# Patient Record
Sex: MALE | Race: WHITE | Employment: FULL TIME | URBAN - NONMETROPOLITAN AREA
[De-identification: names, ages, dates, MRNs, and addresses within clinical notes are randomized per-mention and may not be internally consistent; named-entity substitution may affect disease eponyms.]

---

## 2021-07-21 LAB
AVERAGE GLUCOSE: NORMAL
HBA1C MFR BLD: 9.4 %

## 2021-08-02 ENCOUNTER — OFFICE VISIT (OUTPATIENT)
Dept: INTERNAL MEDICINE CLINIC | Age: 41
End: 2021-08-02

## 2021-08-02 VITALS
HEART RATE: 88 BPM | BODY MASS INDEX: 36.27 KG/M2 | DIASTOLIC BLOOD PRESSURE: 82 MMHG | SYSTOLIC BLOOD PRESSURE: 120 MMHG | WEIGHT: 267.8 LBS | TEMPERATURE: 97.1 F | HEIGHT: 72 IN

## 2021-08-02 DIAGNOSIS — Z79.4 TYPE 2 DIABETES MELLITUS WITHOUT COMPLICATION, WITH LONG-TERM CURRENT USE OF INSULIN (HCC): ICD-10-CM

## 2021-08-02 DIAGNOSIS — E11.9 TYPE 2 DIABETES MELLITUS WITHOUT COMPLICATION, WITH LONG-TERM CURRENT USE OF INSULIN (HCC): ICD-10-CM

## 2021-08-02 PROCEDURE — G0108 DIAB MANAGE TRN  PER INDIV: HCPCS | Performed by: INTERNAL MEDICINE

## 2021-08-02 RX ORDER — RAMIPRIL 1.25 MG/1
1.25 CAPSULE ORAL DAILY
COMMUNITY
End: 2022-07-18 | Stop reason: SDUPTHER

## 2021-08-02 RX ORDER — GLIMEPIRIDE 4 MG/1
4 TABLET ORAL 2 TIMES DAILY WITH MEALS
COMMUNITY
End: 2021-09-17

## 2021-08-02 RX ORDER — ATORVASTATIN CALCIUM 20 MG/1
20 TABLET, FILM COATED ORAL NIGHTLY
COMMUNITY
End: 2022-07-18 | Stop reason: SDUPTHER

## 2021-08-02 RX ORDER — INSULIN GLARGINE 100 [IU]/ML
INJECTION, SOLUTION SUBCUTANEOUS NIGHTLY
COMMUNITY
End: 2022-07-18 | Stop reason: SDUPTHER

## 2021-08-02 NOTE — PATIENT INSTRUCTIONS
Mealtime insulin options. Humalog, Novolog, Fiasp, Admelog     Consider asking about once weekly injections: Trulicity, Bydureon,                                                                                      Ozempic  Insulin pump options:         OmniPod (no tubing), Medtronic and Tandem  Get an appointment scheduled to see your eye doctor  Try to check blood sugars at least 2 times per day           --waking up and either before evening meal or bedtime         Goal . If you check 2 hours after eating-goal is under 150  Get your lab work done!!  Continue with meal plan--try to keep carbohydrates at 60 grams at             Each meal  With your new job, set a plan to get 5,000 steps during the day/ evening  Keep a log of what you are eating for at least 1 week and bring to  Your              Next appointments.  Bring your meter to all of your appointments

## 2021-08-02 NOTE — PROGRESS NOTES
Diabetes Mellitus Type II, Initial Visit:   Arianne Arnold CNP  Patient here for an initial evaluation of Type 2 diabetes mellitus. Current symptoms/problems include none. The patient was initially diagnosed with Type 2 diabetes mellitus 2013. Known diabetic complications: peripheral neuropathy  Cardiovascular risk factors: diabetes mellitus, family history of premature cardiovascular disease, male gender, obesity (BMI >= 30 kg/m2) and smoking/ tobacco exposure  Current diabetic medications include Basaglar, Humalog (not taking), Glimepiride, Metformin, Januvia. Eye exam current (within one year): none. Needs to make appt  Weight trend: fluctuating a bit  Prior visit with dietician: no  Current diet: B 6am                       8am bkfst sausage burritos (2); dietcoke                     L 11a-2pm eat out. 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 no  Is He on ACE inhibitor or angiotensin II receptor blocker? Yes   ramipril (Altace)  Feet: Last assessed 7/21/21 per LEONIDES Kelley CNP    Diabetes education. Recent A1C was 9.4%. [facility], but had not followed up. He reports that his blood sugars have not been in controlled during those years. He was recently started on Humalog, but it is not covered by insurance so he is not taking it. He was also recently started on low dose Januvia.  Rybelsus was tried, but Casa Musa states his blood sugars went high (based on freq urination/ not BG results). He is interested in the [facility] and would be a good candidate. We will request a script. We reviewed therapies; discussing option of getting rid of Glimepiride. Also weekly GLP1 injection vs Januvia. He will contact insurance company regarding meal time insulin. We also briefly discussed insulin pump therapy. He will want to start counting carbohydrates. Encouragement given; reviewed focus of preventing complications. Follow up 2 weeks with RD. Plan:  Reviewed SGM goals; medication options; carbs; exercise; CGM; insulin pump options. Mealtime insulin options. Humalog, Novolog, Fiasp, Admelog     Consider asking about once weekly injections: Trulicity, Bydureon,                                                                                      Ozempic  Insulin pump options:         OmniPod (no tubing), Medtronic and Tandem  Get an appointment scheduled to see your eye doctor  Try to check blood sugars at least 2 times per day           --waking up and either before evening meal or bedtime         Goal . If you check 2 hours after eating-goal is under 150  Get your lab work done!!  Continue with meal plan--try to keep carbohydrates at 60 grams at             Each meal  With your new job, set a plan to get 5,000 steps during the day/ evening  Keep a log of what you are eating for at least 1 week and bring to  Your              Next appointments. Bring your meter to all of your appointments  Casa  voiced understanding via teach back and willingness to participate in the above plan of care. Time spent in direct contact with patient 60 minutes. Denae Briceño CNP,        Please send a script to [phone] for his Dexcom and supplies. 1- Dexcom reader - 1 each. No refills  2- Dexcom transmitter - 1 each every 3 months. Refills x3.  3- Dexcom sensors - 1 each every 10days. Qty 9.  Refills                  x3.

## 2021-08-12 ENCOUNTER — OFFICE VISIT (OUTPATIENT)
Dept: INTERNAL MEDICINE CLINIC | Age: 41
End: 2021-08-12
Payer: COMMERCIAL

## 2021-08-12 VITALS — BODY MASS INDEX: 35.76 KG/M2 | HEIGHT: 72 IN | WEIGHT: 264 LBS | TEMPERATURE: 98.7 F

## 2021-08-12 DIAGNOSIS — Z79.4 TYPE 2 DIABETES MELLITUS WITHOUT COMPLICATION, WITH LONG-TERM CURRENT USE OF INSULIN (HCC): ICD-10-CM

## 2021-08-12 DIAGNOSIS — E11.9 TYPE 2 DIABETES MELLITUS WITHOUT COMPLICATION, WITH LONG-TERM CURRENT USE OF INSULIN (HCC): ICD-10-CM

## 2021-08-12 PROCEDURE — 97802 MEDICAL NUTRITION INDIV IN: CPT | Performed by: DIETITIAN, REGISTERED

## 2021-08-12 NOTE — PATIENT INSTRUCTIONS
1. )  Get familiar with reading the nutrition facts label by looking at serving size and total carbohydrates. - Without a label refer to your carb count guide booklet. 2.)  Measure foods for accuracy in carb counting.    3.)  Healthy carb choices:  whole grains, whole wheat pasta, starchy vegetables, fresh fruit and lowfat/non-fat milk and yogurt. 4.)  Your meal plan is found on the inside cover of your carb counting guide booklet:  1st meal or Brkf:  60 gms carbohydrates + 1-2 oz protein  2nd meal or Lunch: 60 gms carbohydrates + 2-3 oz protein + non-starchy veggies  3rd meal or Dinner:  60 gms carbohydrates + 2-3 oz protein + non- starchy veggies    Snack time:  15 - 20 gms carbohydrates + 1 oz protein    5.)  Choose lean protein most of the time and Cut in 1/2 added fats to help with weight loss efforts. 6.) Bring a 1-2 week food log and meter to next dietitian appt. Thanks. Check BS:  2x/day  Fasting BS (1st thing in the morning) or before a meal (at least 4 hour since last ate), BS goal:  90 - 130  2 hours after the start of a meal, BS goal:  100 - 150    7.)  Work in extra physical activity each day.

## 2021-08-12 NOTE — PROGRESS NOTES
[address] [place] [address]. [address] [place] [address] Tj., MartinDeys[facility], [address]  [phone] (phone)  [phone] (fax)    Patient Name: Edu Buckley. Date of Birth: [birth year]. MRN: [id number]      Assessment: Patient is a 38 y.o. male seen for Initial MNT visit for Type 2 DB.     -Nutritionally relevant labs:   Lab Results   Component Value Date/Time    LABA1C 9.4 07/21/2021 12:00 AM    LABA1C 7.5 (H) 03/27/2019 07:25 AM    LABA1C 7.6 07/18/2015 12:00 AM    GLUCOSE 251 (H) 12/09/2019 08:40 AM    GLUCOSE 189 (H) 03/27/2019 07:25 AM    CHOL 120 03/27/2019 07:25 AM    CHOL 180 03/21/2015 12:00 AM    HDL 30.3 (L) 03/27/2019 07:25 AM    LDLCALC 64 03/27/2019 07:25 AM    TRIG 128 03/27/2019 07:25 AM     Pt unsure of onset of  Diabetes. Per EMR - initial appt with DB nurse educator 2013  Blood sugar trends: Unable to download meter. 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 Household - wife and twin boys 6 yr. Wife works outside the home and boys will start  this year    -Food recall/myfitness pal on phone:   Breakfast: 2 eggs, 4 sl guadarrama, 2 sl toast wheat OR franklyn sausage burritos 2 OR loaded omelet Biscuit [employer] and large hash browns  Lunch: skipped OR chipotle sandwich chicken from [facility] melt double, fried cheese curls OR [employer]  Dinner: Chicken Yanna, green beans OR Franklyn.10 Chicken nuggets OR smoked sausage, fresh green beans, small potatoes OR ham and turkey, wheat bread and brocc salad  Snacks: peanuts    -Main Beverages: Diet coke water or coke zero. 3 - 20 ounces/day. Water at home in the evening and when home. Exercise - none. With DB does not eat fruit as much. occ fruit cocktail cup or applesauce cup. Vegetables - boys like green beans. caulf and brocc. Not corn.   Pt avoids eating fruit usually d/t Diabetes. Occ will eat an [facility] ice or sherbet. -Impression of Dietary Intake: on average, 3 meals per day, on average, 10-12 fast food meals per week, low fiber, high fat/ cholesterol, high salt, lacking fresh fruit and veggies. Current Outpatient Medications on File Prior to Visit   Medication Sig Dispense Refill    glimepiride (AMARYL) 4 MG tablet Take 4 mg by mouth 2 times daily (with meals)      SITagliptin (JANUVIA) 25 MG tablet Take 25 mg by mouth daily      atorvastatin (LIPITOR) 20 MG tablet Take 20 mg by mouth nightly      ramipril (ALTACE) 1.25 MG capsule Take 1.25 mg by mouth daily      insulin glargine (BASAGLAR KWIKPEN) 100 UNIT/ML injection pen Inject into the skin nightly Inject 20 units in the morning and 30 units bedtime      metFORMIN (GLUCOPHAGE) 500 MG tablet Take 1,000 mg by mouth 2 times daily (with meals)       No current facility-administered medications on file prior to visit. Vitals from current and previous visits:  Temp 98.7 °F (37.1 °C)   Ht 6' (1.829 m)   Wt 264 lb (119.7 kg)   BMI 35.80 kg/m²     -Body mass index is 35.8 kg/m². 35-39.9 - Obesity Grade II.   -Weight goal: lose weight. Nutrition Diagnosis:   Food and nutrition-related knowledge deficit related to lack of MNT/currently undergoing MNT as evidenced by Conditions associated with a diagnosis or treatment: Type 2 DB. Intervention:  -Impression: Pt not interactive in session today. Encouragement given to make gradual changes in his diet an not to be overwhelmed with carb counting his food.   Pt has not gotten in the habit of packing his lunch so relies on eating out.    -Instructed the patient on: Carbohydrate Counting, Consistent Carbohydrate Intake, Food Label Reading, Meal Planning for Regular, Balanced Meals & Snacks, The Importance of Regular Physical Activity and making lower fat food choices, heart healthy fats vs bad fats    -Handouts given for: carbohydrate counting, healthy snacks, 180 gm sample meal plans/menus and fancy plate pics, good/bad/ugly with fats. Patient Instructions   1.)  Get familiar with reading the nutrition facts label by looking at serving size and total carbohydrates. - Without a label refer to your carb count guide booklet. 2.)  Measure foods for accuracy in carb counting.    3.)  Healthy carb choices:  whole grains, whole wheat pasta, starchy vegetables, fresh fruit and lowfat/non-fat milk and yogurt. 4.)  Your meal plan is found on the inside cover of your carb counting guide booklet:  1st meal or Brkf:  60 gms carbohydrates + 1-2 oz protein  2nd meal or Lunch: 60 gms carbohydrates + 2-3 oz protein + non-starchy veggies  3rd meal or Dinner:  60 gms carbohydrates + 2-3 oz protein + non- starchy veggies    Snack time:  15 - 20 gms carbohydrates + 1 oz protein    5.)  Choose lean protein most of the time and Cut in 1/2 added fats to help with weight loss efforts. 6.) Bring a 1-2 week food log and meter to next dietitian appt. Thanks. Check BS:  2x/day  Fasting BS (1st thing in the morning) or before a meal (at least 4 hour since last ate), BS goal:  90 - 130  2 hours after the start of a meal, BS goal:  100 - 150    7.)  Work in extra physical activity each day. -Nutrition prescription: 1600 - 1700 calories/day, 180 - 200 g carbs/day. Comprehension verified using teachback method. Monitoring/Evaluation:   -Followup visit: 8 weeks with dietitian.   -Receptiveness to education/goals: Agreeable.  -Evaluation of education: Needs reinforcement.  -Readiness to change: precontemplative- carb counting his food, packing a lunch. -Expected compliance: fair. Thank you for your referral of this patient. Total time involved in direct patient education: 60 minutes for initial MNT visit.

## 2021-08-17 LAB
ABSOLUTE BASO #: 0 X10E9/L (ref 0–0.2)
ABSOLUTE EOS #: 0.1 X10E9/L (ref 0–0.4)
ABSOLUTE LYMPH #: 2.4 X10E9/L (ref 1–3.5)
ABSOLUTE MONO #: 0.7 X10E9/L (ref 0–0.9)
ABSOLUTE NEUT #: 6.8 X10E9/L (ref 1.5–6.6)
ALBUMIN SERPL-MCNC: 4.3 G/DL (ref 3.2–5.3)
ALK PHOSPHATASE: 78 U/L (ref 39–130)
ALT SERPL-CCNC: 18 U/L (ref 0–40)
ANION GAP SERPL CALCULATED.3IONS-SCNC: 11 MMOL/L (ref 5–15)
APPEARANCE: CLEAR
AST SERPL-CCNC: 13 U/L (ref 0–41)
BASOPHILS RELATIVE PERCENT: 0.5 %
BILIRUB SERPL-MCNC: 0.5 MG/DL (ref 0.3–1.2)
BILIRUBIN: NEGATIVE
BUN BLDV-MCNC: 10 MG/DL (ref 5–23)
CALCIUM SERPL-MCNC: 9.7 MG/DL (ref 8.5–10.5)
CHLORIDE BLD-SCNC: 103 MMOL/L (ref 98–109)
CHOLESTEROL/HDL RATIO: 4.4 (ref 1–5)
CHOLESTEROL: 118 MG/DL (ref 150–200)
CO2: 28 MMOL/L (ref 22–32)
COLOR: YELLOW
CREAT SERPL-MCNC: 0.88 MG/DL (ref 0.6–1.3)
CREATINE, URINE: 225.09 MG/DL
EGFR AFRICAN AMERICAN: >60 ML/MIN/1.73SQ.M
EGFR IF NONAFRICAN AMERICAN: >60 ML/MIN/1.73SQ.M
EOSINOPHILS RELATIVE PERCENT: 0.7 %
GLUCOSE BLD-MCNC: NEGATIVE MG/DL
GLUCOSE: 152 MG/DL (ref 65–99)
HCT VFR BLD CALC: 40.8 % (ref 39–49)
HDLC SERPL-MCNC: 27 MG/DL
HEMOGLOBIN: 13.7 G/DL (ref 13–17)
KETONES, URINE: NEGATIVE MG/DL
LDL CHOLESTEROL CALCULATED: 62 MG/DL
LDL/HDL RATIO: 2.3
LEUKOCYTE ESTERASE, URINE: NEGATIVE
LYMPHOCYTE %: 24.1 %
MCH RBC QN AUTO: 28.5 PG (ref 27–34)
MCHC RBC AUTO-ENTMCNC: 33.6 G/DL (ref 32–36)
MCV RBC AUTO: 85 FL (ref 80–100)
MICROALBUMIN/CREAT 24H UR: <0.7 MG/DL (ref 0–1.9)
MICROALBUMIN/CREAT UR-RTO: NORMAL MG/G CREAT (ref 0–30)
MONOCYTES # BLD: 7.3 %
NEUTROPHILS RELATIVE PERCENT: 67.4 %
NITRITE, URINE: NEGATIVE
OCCULT BLOOD,URINE: NEGATIVE
OTHER MICROSCOPIC ELEMENTS: ABNORMAL
PDW BLD-RTO: 13.3 % (ref 11.5–15)
PH: 6 (ref 5–8.5)
PLATELETS: 351 X10E9/L (ref 150–450)
PMV BLD AUTO: 8.4 FL (ref 7–12)
POTASSIUM SERPL-SCNC: 3.8 MMOL/L (ref 3.5–5)
PROTEIN, URINE: ABNORMAL MG/DL
RBC: 1 /HPF (ref 0–5)
RBC: 4.8 X10E12/L (ref 4.1–5.7)
SODIUM BLD-SCNC: 142 MMOL/L (ref 134–146)
SP GRAVITY MISCELLANEOUS: 1.03 (ref 1–1.03)
T4 FREE: 0.95 NG/DL (ref 0.61–1.6)
TOTAL PROTEIN: 7.3 G/DL (ref 6–8)
TRIGL SERPL-MCNC: 143 MG/DL (ref 27–150)
TSH SERPL DL<=0.05 MIU/L-ACNC: 1.36 UIU/ML (ref 0.49–4.67)
UROBILINOGEN, URINE: <1.1 EU/DL
VLDLC SERPL CALC-MCNC: 29 MG/DL (ref 0–30)
WBC: 1 /HPF (ref 0–5)
WBC: 10.1 X10E9/L (ref 4–11)

## 2021-09-01 ENCOUNTER — OFFICE VISIT (OUTPATIENT)
Dept: INTERNAL MEDICINE CLINIC | Age: 41
End: 2021-09-01
Payer: COMMERCIAL

## 2021-09-01 ENCOUNTER — TELEPHONE (OUTPATIENT)
Dept: INTERNAL MEDICINE CLINIC | Age: 41
End: 2021-09-01

## 2021-09-01 VITALS
DIASTOLIC BLOOD PRESSURE: 84 MMHG | SYSTOLIC BLOOD PRESSURE: 132 MMHG | WEIGHT: 263.6 LBS | TEMPERATURE: 97.4 F | BODY MASS INDEX: 35.7 KG/M2 | HEART RATE: 96 BPM | HEIGHT: 72 IN

## 2021-09-01 DIAGNOSIS — Z79.4 TYPE 2 DIABETES MELLITUS WITHOUT COMPLICATION, WITH LONG-TERM CURRENT USE OF INSULIN (HCC): ICD-10-CM

## 2021-09-01 DIAGNOSIS — E11.9 TYPE 2 DIABETES MELLITUS WITHOUT COMPLICATION, WITH LONG-TERM CURRENT USE OF INSULIN (HCC): ICD-10-CM

## 2021-09-01 PROCEDURE — G0108 DIAB MANAGE TRN  PER INDIV: HCPCS | Performed by: INTERNAL MEDICINE

## 2021-09-01 RX ORDER — METFORMIN HYDROCHLORIDE 500 MG/1
1000 TABLET, FILM COATED, EXTENDED RELEASE ORAL 2 TIMES DAILY WITH MEALS
COMMUNITY
End: 2022-07-18

## 2021-09-01 ASSESSMENT — PATIENT HEALTH QUESTIONNAIRE - PHQ9
1. LITTLE INTEREST OR PLEASURE IN DOING THINGS: NOT AT ALL
DEPRESSION UNABLE TO ASSESS: YES
SUM OF ALL RESPONSES TO PHQ9 QUESTIONS 1 & 2: 0
2. FEELING DOWN, DEPRESSED OR HOPELESS: NOT AT ALL

## 2021-09-01 NOTE — TELEPHONE ENCOUNTER
Diabetes education. Scheduled to see Dr. Gonzalo Maria 9/9/21. Was ordered the Dexcom CGM, but cost was over $2000. Pharmacist instructed that David Norris would be cheaper. Please note the attached scripts for Freestyle Libre2.

## 2021-09-01 NOTE — PROGRESS NOTES
[facility]  [address] [facility], [address]  [phone] (phone)  [phone] (fax)    Patient ID: Reynold Lopez [id number]  Referring Provider: Davin Blake CNP     Patient's name and  were verified. Subjective:    He presents for His follow-up diabetic visit. He has type 2 diabetes mellitus. Home regimen includes: Insulin, Biguanide, Sulfonylurea and DDP-IV-1 He is compliant most of the time. Assessment:     Lab Results   Component Value Date    LABA1C 9.4 2021    BUN 10 2021    CREATININE 0.88 2021     There were no vitals filed for this visit. Wt Readings from Last 3 Encounters:   21 264 lb (119.7 kg)   21 267 lb 12.8 oz (121.5 kg)   08/06/15 268 lb (121.6 kg)     Ht Readings from Last 3 Encounters:   21 6' (1.829 m)   21 6' (1.829 m)   08/06/15 6' (1.829 m)       Current monitoring regimen: home blood tests - 2 times daily  Home blood sugar trends: FBS's 134-213. Dinner 171 BT   Any episodes of hypoglycemia? no  Depression screening completed 2021 score 0  Previous visit with dietician: yes - 2021  Current diet: B saus burrito; diet coke                       L 10 boneless wings; diet coke                       D shredded sandwich (wheat bead)/ green beans                       Snacking - rare peanuts/ nuts                       Diet coke 3-4 cans  Current exercise: 5,000 steps per day at work  Eye exam current (within one year): no ; no appt made  Any history of foot problems? no  Last foot exam: Davin Blake 2021  Immunizations up to date: no; refuses  Taking ASA:  No - If not why? Not indicated  Appropriate for use of MyChart Glucose Grid:  Yes    Focus:     Diabetes education. Recent A1C 9.4%. Krysten Mendes has met with the dietician and trying to keep carbs limited-some meals only getting 20-30 grams carb. Weight is down 4#. Exercise is limited. Krysten Mendes is struggling with blood sugars going up overnight.  He is not eating a bedtime snack. Discussed evening activity/ shifting some of AM Metformin to evening. Basal medication can help as well, if needed. He tried to obtain the Dexcom CGM, but the cost was over $2000. He states the pharmacist reports he could get the Ramos for much less. Will request script for Gabby CGM. He has not looked into coverage for bolus insulin and has not given any thought to insulin pump therapy. He could benefit from GLP1 therapy and is receptive to trying this approach. Will follow up 1 week and 1 month to review progress. DSME PLAN:   Discussed general issues about diabetes pathophysiology and management. Counseling at today's visit: BG goals; carbs; exercise; CGM; treating low BS; medication action. Make an appointment to get your eyes checked  Set a goal to get at least 5-10 minutes of brisk exercise in the evening  Try moving one of your morning Metformin tablets to dinner                 1 tablet after breakfast and 3 tablets after dinner              *if this doesn't help morning blood sugars or upsets your stomach,              Move it back  We will check on Gabby 2 system--will send  Meter download, medications, PMH and nursing assessment reviewed . Daniel Diaz states He is willing to participate in this plan of care and verbalized understanding of all instructions provided. Teach back used to verify comprehension. Total time involved in direct patient education: 60 minutes.

## 2021-09-01 NOTE — PATIENT INSTRUCTIONS
Make an appointment to get your eyes checked  Set a goal to get at least 5-10 minutes of brisk exercise in the evening  Try moving one of your morning Metformin tablets to dinner                 1 tablet after breakfast and 3 tablets after dinner              *if this doesn't help morning blood sugars or upsets your stomach,              Move it back  We will check on Zhaopin system--will send

## 2021-09-02 RX ORDER — FLASH GLUCOSE SCANNING READER
1 EACH MISCELLANEOUS ONCE
Qty: 1 EACH | Refills: 0 | Status: SHIPPED | OUTPATIENT
Start: 2021-09-02 | End: 2021-09-02

## 2021-09-02 RX ORDER — FLASH GLUCOSE SENSOR
1 KIT MISCELLANEOUS
Qty: 2 EACH | Refills: 5 | Status: SHIPPED | OUTPATIENT
Start: 2021-09-02 | End: 2022-02-11 | Stop reason: SDUPTHER

## 2021-09-17 ENCOUNTER — OFFICE VISIT (OUTPATIENT)
Dept: INTERNAL MEDICINE CLINIC | Age: 41
End: 2021-09-17
Payer: COMMERCIAL

## 2021-09-17 VITALS
DIASTOLIC BLOOD PRESSURE: 82 MMHG | TEMPERATURE: 97.2 F | WEIGHT: 264.8 LBS | SYSTOLIC BLOOD PRESSURE: 120 MMHG | HEART RATE: 88 BPM | BODY MASS INDEX: 35.91 KG/M2

## 2021-09-17 DIAGNOSIS — E08.00 DIABETES MELLITUS DUE TO UNDERLYING CONDITION WITH HYPEROSMOLARITY WITHOUT COMA, WITH LONG-TERM CURRENT USE OF INSULIN (HCC): Primary | ICD-10-CM

## 2021-09-17 DIAGNOSIS — Z79.4 DIABETES MELLITUS DUE TO UNDERLYING CONDITION WITH HYPEROSMOLARITY WITHOUT COMA, WITH LONG-TERM CURRENT USE OF INSULIN (HCC): Primary | ICD-10-CM

## 2021-09-17 DIAGNOSIS — E78.2 MIXED HYPERLIPIDEMIA: ICD-10-CM

## 2021-09-17 PROCEDURE — 99204 OFFICE O/P NEW MOD 45 MIN: CPT | Performed by: INTERNAL MEDICINE

## 2021-09-17 RX ORDER — FLASH GLUCOSE SCANNING READER
EACH MISCELLANEOUS
COMMUNITY
Start: 2021-09-02

## 2021-09-17 RX ORDER — INSULIN LISPRO 100 [IU]/ML
INJECTION, SOLUTION INTRAVENOUS; SUBCUTANEOUS
Qty: 15 PEN | Refills: 5 | Status: SHIPPED | OUTPATIENT
Start: 2021-09-17 | End: 2022-07-18 | Stop reason: SDUPTHER

## 2021-09-17 SDOH — ECONOMIC STABILITY: FOOD INSECURITY: WITHIN THE PAST 12 MONTHS, THE FOOD YOU BOUGHT JUST DIDN'T LAST AND YOU DIDN'T HAVE MONEY TO GET MORE.: NEVER TRUE

## 2021-09-17 SDOH — ECONOMIC STABILITY: FOOD INSECURITY: WITHIN THE PAST 12 MONTHS, YOU WORRIED THAT YOUR FOOD WOULD RUN OUT BEFORE YOU GOT MONEY TO BUY MORE.: NEVER TRUE

## 2021-09-17 ASSESSMENT — PATIENT HEALTH QUESTIONNAIRE - PHQ9
SUM OF ALL RESPONSES TO PHQ9 QUESTIONS 1 & 2: 0
SUM OF ALL RESPONSES TO PHQ QUESTIONS 1-9: 0
2. FEELING DOWN, DEPRESSED OR HOPELESS: 0
SUM OF ALL RESPONSES TO PHQ QUESTIONS 1-9: 0
1. LITTLE INTEREST OR PLEASURE IN DOING THINGS: 0
SUM OF ALL RESPONSES TO PHQ QUESTIONS 1-9: 0

## 2021-09-17 ASSESSMENT — SOCIAL DETERMINANTS OF HEALTH (SDOH): HOW HARD IS IT FOR YOU TO PAY FOR THE VERY BASICS LIKE FOOD, HOUSING, MEDICAL CARE, AND HEATING?: NOT VERY HARD

## 2021-09-17 NOTE — PROGRESS NOTES
(LIPITOR) 20 MG tablet Take 20 mg by mouth nightly      ramipril (ALTACE) 1.25 MG capsule Take 1.25 mg by mouth daily      insulin glargine (BASAGLAR KWIKPEN) 100 UNIT/ML injection pen Inject into the skin nightly Inject 20 units in the morning and 30 units bedtime       No current facility-administered medications for this visit. 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[employer]   Tobacco Use    Smoking status: Former Smoker     Packs/day: 1.00     Years: 15.00     Pack years: 15.00     Types: Cigarettes     Quit date: 2014     Years since quittin.1    Smokeless tobacco: Never Used   Substance and Sexual Activity    Alcohol use: No     Alcohol/week: 0.0 standard drinks    Drug use: No    Sexual activity: Not on file   Other Topics Concern    Not on file   Social History Narrative    Not on file     Social Determinants of Health     Financial Resource Strain: Low Risk     Difficulty of Paying Living Expenses: Not very hard   Food Insecurity: No Food Insecurity    Worried About Running Out of Food in the Last Year: Never true    Ashlee of Food in the Last Year: Never true   Transportation Needs:     Lack of Transportation (Medical):      Lack of Transportation (Non-Medical):    Physical Activity:     Days of Exercise per Week:     Minutes of Exercise per Session:    Stress:     Feeling of Stress :    Social Connections:     Frequency of Communication with Friends and Family:     Frequency of Social Gatherings with Friends and Family:     Attends Rastafarian Services:     Active Member of Clubs or Organizations:     Attends Club or Organization Meetings:     Marital Status:    Intimate Partner Violence:     Fear of Current or Ex-Partner:     Emotionally Abused:     Physically Abused:     Sexually Abused:        Family History   Problem Relation Age of Onset    Diabetes Mother     Heart Disease Father     Diabetes Maternal Grandfather     Cancer Paternal Grandmother     Heart Disease Paternal Grandfather        Review of Systems     See HPI     /82 (Site: Left Upper Arm)   Pulse 88   Temp 97.2 °F (36.2 °C)   Wt 264 lb 12.8 oz (120.1 kg)   BMI 35.91 kg/m²     Physical Examination: General appearance - alert, well appearing, and in no distress and normal appearing weight  Mental status - alert, oriented to person, place, and time  Neck - supple, no significant adenopathy  Chest - clear to auscultation, no wheezes, rales or rhonchi, symmetric air entry  Heart - normal rate, regular rhythm, normal S1, S2, no murmurs, rubs, clicks or gallops  Abdomen - soft, nontender, nondistended, no masses or organomegaly  Neurological - alert, oriented, normal speech, no focal findings or movement disorder noted  Musculoskeletal - no joint tenderness, deformity or swelling  Extremities - peripheral pulses normal, no pedal edema, no clubbing or cyanosis  Skin - normal coloration and turgor, no rashes, no suspicious skin lesions noted     I have reviewed recent diagnostic testing including labs    Lab Results   Component Value Date     08/17/2021    K 3.8 08/17/2021     08/17/2021    CO2 28 08/17/2021    BUN 10 08/17/2021    CREATININE 0.88 08/17/2021    GLUCOSE 152 08/17/2021    GLUCOSE Negative 08/17/2021    CALCIUM 9.7 08/17/2021     Cholesterol panel and urine microalb from last month noted       No orders of the defined types were placed in this encounter. Diagnosis Orders   1. Diabetes mellitus due to underlying condition with hyperosmolarity without coma, with long-term current use of insulin ([address]     2. Mixed hyperlipidemia         PLAN:      DM-2   Change basaglar to 33 units in am , and 20 u PM  And increase the jannuvia to 100 mg daily and stopped   amaryl altogether., which can cause hypoglycemia. May consider Trulicity or similar agents down the road, as I made several changes today will hold off on the SGLT-2 at this time. He was encouraged to call us with any low sugars or seek medical help ASAP. She is also encouraged to closely follow-up with her dietitian next month, and I plan on seeing him on November 1. Dyslipidemia is doing well on the current regimen LDL total cholesterol adequately controlled per ADA guidelines, will continue with Lipitor 20 mg daily    He is on low-dose ramipril 1.25 mg we will continue with the same, as outlined above the recent microalbumin was noted which is within normal range    RTO  4 + weeks      Signed by : Anamika Deng M.D.     [facility]  [facility], [facility] [place], [address]    Tel  [phone]  Fax [phone]

## 2021-09-17 NOTE — PATIENT INSTRUCTIONS
Take basaglar 33 U in am,  20 U PM    Take novolog 5 units + scale pre meals three times per day     Stop[ the amaryl or glimerperide    Increase the januvia to 100 mg daily .

## 2021-10-22 LAB
AVERAGE GLUCOSE: NORMAL
HBA1C MFR BLD: 7.6 %

## 2021-11-01 ENCOUNTER — OFFICE VISIT (OUTPATIENT)
Dept: INTERNAL MEDICINE CLINIC | Age: 41
End: 2021-11-01
Payer: COMMERCIAL

## 2021-11-01 VITALS
DIASTOLIC BLOOD PRESSURE: 80 MMHG | TEMPERATURE: 98.2 F | BODY MASS INDEX: 35.61 KG/M2 | HEART RATE: 88 BPM | SYSTOLIC BLOOD PRESSURE: 114 MMHG | WEIGHT: 262.6 LBS

## 2021-11-01 DIAGNOSIS — E78.5 DYSLIPIDEMIA, GOAL LDL BELOW 70: ICD-10-CM

## 2021-11-01 DIAGNOSIS — E08.00 DIABETES MELLITUS DUE TO UNDERLYING CONDITION WITH HYPEROSMOLARITY WITHOUT COMA, WITH LONG-TERM CURRENT USE OF INSULIN (HCC): Primary | ICD-10-CM

## 2021-11-01 DIAGNOSIS — Z79.4 DIABETES MELLITUS DUE TO UNDERLYING CONDITION WITH HYPEROSMOLARITY WITHOUT COMA, WITH LONG-TERM CURRENT USE OF INSULIN (HCC): Primary | ICD-10-CM

## 2021-11-01 PROCEDURE — 99213 OFFICE O/P EST LOW 20 MIN: CPT | Performed by: INTERNAL MEDICINE

## 2021-11-01 PROCEDURE — G8484 FLU IMMUNIZE NO ADMIN: HCPCS | Performed by: INTERNAL MEDICINE

## 2021-11-01 PROCEDURE — 1036F TOBACCO NON-USER: CPT | Performed by: INTERNAL MEDICINE

## 2021-11-01 PROCEDURE — G8417 CALC BMI ABV UP PARAM F/U: HCPCS | Performed by: INTERNAL MEDICINE

## 2021-11-01 PROCEDURE — G8427 DOCREV CUR MEDS BY ELIG CLIN: HCPCS | Performed by: INTERNAL MEDICINE

## 2021-11-01 RX ORDER — METFORMIN HYDROCHLORIDE 500 MG/1
TABLET, EXTENDED RELEASE ORAL
COMMUNITY
Start: 2021-10-21 | End: 2021-11-01

## 2021-11-01 NOTE — PROGRESS NOTES
[address] [facility]   [facility] [address] OFF[address]GG II.LOUISA, Sera Russell  Dept: [phone]  Dept Fax: [phone]        YOB: 1980    Chief Complaint   Patient presents with    Diabetes     10/22/21- 7.6 Grant Rodriguez CNP    Other     Needs Eye Exam       Christian Rosenbaum presents for follow-up of his DM-2   Recently evaluated in our diabetic clinic, by Gary Arriaga . He got the  Ramos, as its cost effective vs Dexcom . His last A1c is 9.4%, and last week is 7.6 % with normal BUN, Creat from 8/21. He is due for another by end of next month. Hx of Dm  About 4-5 yrs , has family hx of DM. He follows up with Grant Rodriguez CNP as PCP. novolog was declined, humalog was approved per pt, but he needs to get them from express script. He is here for optimizing the diabetic Rx. NO CP or SOB, no recent fever or chills. He did not get the covid vaccine. He was a former smoker, quit 10 yrs ago. He got a foot exam by his  PCP, 8/21 . Sugars have been downloaded the [employer] [place], to 7% are in the target range of 7180, and about 35% on the higher range of 181-2 50. No hypoglycemic symptoms. He claims he is eating better. Patient Active Problem List   Diagnosis    Diabetes ([facility] [address]    Dyslipidemia, goal LDL below 70       Current Outpatient Medications   Medication Sig Dispense Refill    dapagliflozin (FARXIGA) 10 MG tablet Take 1 tablet by mouth every morning 30 tablet 3    Continuous Blood Gluc  (FREESTYLE CHRISTINA 2 READER) LEEROY USE AS DIRECTED TO TEST BLOOD GLUCOSE.  insulin lispro, 1 Unit Dial, (HUMALOG KWIKPEN) 100 UNIT/ML SOPN 5 units Plus scale pre meals TID (Patient taking differently: 5 units Plus group scale 2 pre meals TID. max daily dose 70 units. ) 15 pen 5    SITagliptin (JANUVIA) 100 MG tablet Take 1 tablet by mouth daily New dose 30 tablet 3    Continuous Blood Gluc Sensor (FREESTYLE CHRISTINA 2 SENSOR) MISC 1 each by Does not apply route every 14 days 2 each 5    per Session:    Stress:     Feeling of Stress :    Social Connections:     Frequency of Communication with Friends and Family:     Frequency of Social Gatherings with Friends and Family:     Attends Adventist Services:     Active Member of Clubs or Organizations:     Attends Club or Organization Meetings:     Marital Status:    Intimate Partner Violence:     Fear of Current or Ex-Partner:     Emotionally Abused:     Physically Abused:     Sexually Abused:        Family History   Problem Relation Age of Onset    Diabetes Mother     Heart Disease Father     Diabetes Maternal Grandfather     Cancer Paternal Grandmother     Heart Disease Paternal Grandfather        Review of Systems     See HPI     /80 (Site: Left Upper Arm)   Pulse 88   Temp 98.2 °F (36.8 °C)   Wt 262 lb 9.6 oz (119.1 kg)   BMI 35.61 kg/m²     Physical Examination: General appearance - alert, well appearing, and in no distress and normal appearing weight  Mental status - alert, oriented to person, place, and time  Neck - supple, no significant adenopathy  Chest - clear to auscultation, no wheezes, rales or rhonchi, symmetric air entry  Heart - normal rate, regular rhythm, normal S1, S2, no murmurs, rubs, clicks or gallops  Abdomen - soft, nontender, nondistended, no masses or organomegaly  Neurological - alert, oriented, normal speech, no focal findings or movement disorder noted  Musculoskeletal - no joint tenderness, deformity or swelling  Extremities - peripheral pulses normal, no pedal edema, no clubbing or cyanosis  Skin - normal coloration and turgor, no rashes, no suspicious skin lesions noted     I have reviewed recent diagnostic testing including labs    Lab Results   Component Value Date     08/17/2021    K 3.8 08/17/2021     08/17/2021    CO2 28 08/17/2021    BUN 10 08/17/2021    CREATININE 0.88 08/17/2021    GLUCOSE 152 08/17/2021    GLUCOSE Negative 08/17/2021    CALCIUM 9.7 08/17/2021     Cholesterol panel optimal, total cholesterol 118, LDL of 62 and triglyceride 143 with an HDL of 27 is on the lower side       No orders of the defined types were placed in this encounter. Diagnosis Orders   1. Diabetes mellitus due to underlying condition with hyperosmolarity without coma, with long-term current use of insulin ([address]     2. Dyslipidemia, goal LDL below 70         PLAN:      DM-2     Change basaglar to 33 units in am , and 20 u PM  And increase the jannuvia to 100 mg daily and stopped   amaryl on the last visit. , added farxiga 10 mg daily   On this visit, samples given along with coupon. Encouraged him to get the annual eye exam.         Dyslipidemia is doing well on the current regimen LDL total cholesterol adequately controlled per ADA guidelines, will continue with Lipitor 20 mg daily    He is on low-dose ramipril 1.25 mg we will continue with the same, as outlined above the recent microalbumin was noted which is within normal range    RTO  3 months . Signed by : Alicia Benito M.D.     [address] [facility]  2003 [facility], [address] [place] [address] Dr ROOSEVELT MURRELL II.VIERTEL, One [facility]    Tel  [phone]  Fax [phone]

## 2021-11-16 ENCOUNTER — OFFICE VISIT (OUTPATIENT)
Dept: INTERNAL MEDICINE CLINIC | Age: 41
End: 2021-11-16
Payer: COMMERCIAL

## 2021-11-16 DIAGNOSIS — E11.9 TYPE 2 DIABETES MELLITUS WITHOUT COMPLICATION, WITH LONG-TERM CURRENT USE OF INSULIN (HCC): ICD-10-CM

## 2021-11-16 DIAGNOSIS — Z79.4 TYPE 2 DIABETES MELLITUS WITHOUT COMPLICATION, WITH LONG-TERM CURRENT USE OF INSULIN (HCC): ICD-10-CM

## 2021-11-16 PROCEDURE — G0108 DIAB MANAGE TRN  PER INDIV: HCPCS | Performed by: INTERNAL MEDICINE

## 2021-11-16 NOTE — PATIENT INSTRUCTIONS
1. Humalog dose: 8 units for breakfast, 8 units for lunch, and 5 units for dinner + group 3 sliding scale. Keep taking your Basaglar 33 units in the morning and 20 units at bedtime      GROUP 3    Blood Sugar Dose fast acting insulin    Add none   150-199 Add 3 unit   200-249 Add 6 units   250-299 Add 9 units   300-above Add 12 units       2. Try to get 8,000-10,000 steps at least 5 days per week   -Or do a 30 min elliptical exercise on less active days    3. Time to schedule diabetes eye exam

## 2021-11-16 NOTE — PROGRESS NOTES
[facility]  [address] [address] [facility], [address]  [phone] (phone)  [phone] (fax)    Patient ID: Jennifer Alamo 1980  Referring Provider: Radha Aleman CNP     Patient's name and  were verified. Subjective:    He presents for His follow-up diabetic visit. He has type 2 diabetes mellitus. Home regimen includes: Insulin, Biguanide, Sulfonylurea and DDP-IV-1 He is compliant most of the time. Assessment:     Lab Results   Component Value Date    LABA1C 7.6 10/22/2021    BUN 10 2021    CREATININE 0.88 2021     Vitals:    21 0943   BP: 126/69   Pulse: 81   Temp: 98 °F (36.7 °C)   Weight: 257 lb 6.4 oz (116.8 kg)     Wt Readings from Last 3 Encounters:   21 257 lb 6.4 oz (116.8 kg)   21 262 lb 9.6 oz (119.1 kg)   21 264 lb 12.8 oz (120.1 kg)     Ht Readings from Last 3 Encounters:   21 6' (1.829 m)   21 6' (1.829 m)   21 6' (1.829 m)       Diabetes Pharmacotherapy:  Basaglar - Inject 33 units in the morning and 20 units bedtime    -2-3 nights/week skips nighttime basaglar  Humalog - 5 + group 2 scale TID (sometimes adds extra)   ~11 units/meal on average  Farxiga 10mg daily  Metformin 1000mg BID  Januvia 100mg daily    Glucose Trends:   Current monitoring regimen: Freestyle Gabby  Home blood sugar trends: V High: 5%, High: 32%, Target: 63%, Low: 0%   -trends: Most readings are in target range, except for post prandial spikes after higher carbohydrate lunches  Any episodes of hypoglycemia?  2x/month early in the morning (per patient report); none on today's download    Lifestyle Factors:   Previous visit with dietician: 21  Current diet: B: 2 sausage burrito @ [employer] (50g) OR eggs, sausage, toast OR Lao toast/pancakes            L: Burger (42g) - no fries, Dt soda                       D: bologna sandwich w/ mayonaise (35g) + veggies or chips OR pasta                       Snacks: peanuts or cashews if hungry; HS: 1-2x/week cup of Ngt4u.inc Ice                       Beverages: Crystal lite lemonade most of the time, Coke Zero  Current exercise: job- engineering security sales; has elliptical, but doesn't use    Health Maintenance:  Eye exam current (within one year): overdue  Any history of foot problems? no  Last foot exam: 9/2021  Immunizations up to date: No - due for flu and pneumonia vaccination  Taking ASA:  no  Taking statin: atorvastatin   Last urine microalbumin: WNL (8/17/21)  Taking ACE/ARB: ramipril     Focus:   Diabetes Education. Last A1C was decreased from 9.4% to 7.6% (10/22/21) when checked at PCP office. Congratulated Ilya Alex on this significant improvement! Robby's CGM shows that 63% of readings are at goal, with most elevated readings due to post-prandial highs. Ilya Alex has already been self adjusting his Humalog for higher carbohydrate meals and also adds a couple units to his group 2 sliding scale. Discussed the importance of standardizing these changes (provided a group 3 scale for reference) and reviewed carb coverage + BG correction purpose. Emphasized importance of adherence to [address] consistently twice a day, as this affects the BG for the next 24 hours. Encouraged regular physical activity, specifically on days when he is less active at work. DSME PLAN:   Discussed general issues about diabetes pathophysiology and management. Counseling at today's visit: focused on the need for regular aerobic exercise and discussed the advantages of a diet low in carbohydrates. 1. Humalog dose: 8 units for breakfast, 8 units for lunch, and 5 units for dinner + group 3 sliding scale. Keep taking your Basaglar 33 units in the morning and 20 units at bedtime    GROUP 3    Blood Sugar Dose fast acting insulin    Add none   150-199 Add 3 unit   200-249 Add 6 units   250-299 Add 9 units   300-above Add 12 units       2. Try to get 8,000-10,000 steps at least 5 days per week   -Or do a 30 min elliptical exercise on less active days    3. Time to schedule diabetes eye exam     Meter download, medications, PMH and nursing assessment reviewed. Jennifer Alamo states He is willing to participate in this plan of care and verbalized understanding of all instructions provided. Teach back used to verify comprehension. Follow-up: 3 months physician, 5 months DM Clinic RN (patient denied 3 month f/u with RN)    Total time involved in direct patient education: 60 minutes.      For Ted Feliz in place:  No   Time Spent (min): Courtney ([address]) Loc Alcazar, PharmD, [facility]  Internal Medicine Clinical Pharmacist  [phone]

## 2021-12-06 VITALS
DIASTOLIC BLOOD PRESSURE: 69 MMHG | SYSTOLIC BLOOD PRESSURE: 126 MMHG | TEMPERATURE: 98 F | HEART RATE: 81 BPM | WEIGHT: 257.4 LBS | BODY MASS INDEX: 34.91 KG/M2

## 2022-01-24 RX ORDER — SITAGLIPTIN 100 MG/1
TABLET, FILM COATED ORAL
Qty: 30 TABLET | Refills: 0 | Status: SHIPPED | OUTPATIENT
Start: 2022-01-24 | End: 2022-01-31 | Stop reason: SDUPTHER

## 2022-02-11 ENCOUNTER — OFFICE VISIT (OUTPATIENT)
Dept: INTERNAL MEDICINE CLINIC | Age: 42
End: 2022-02-11
Payer: COMMERCIAL

## 2022-02-11 VITALS
DIASTOLIC BLOOD PRESSURE: 78 MMHG | HEART RATE: 82 BPM | SYSTOLIC BLOOD PRESSURE: 114 MMHG | TEMPERATURE: 98 F | WEIGHT: 257.4 LBS | BODY MASS INDEX: 34.91 KG/M2

## 2022-02-11 DIAGNOSIS — E11.9 TYPE 2 DIABETES MELLITUS WITHOUT COMPLICATION, WITH LONG-TERM CURRENT USE OF INSULIN (HCC): Primary | ICD-10-CM

## 2022-02-11 DIAGNOSIS — E66.01 CLASS 3 SEVERE OBESITY DUE TO EXCESS CALORIES WITHOUT SERIOUS COMORBIDITY IN ADULT, UNSPECIFIED BMI (HCC): ICD-10-CM

## 2022-02-11 DIAGNOSIS — Z79.4 TYPE 2 DIABETES MELLITUS WITHOUT COMPLICATION, WITH LONG-TERM CURRENT USE OF INSULIN (HCC): Primary | ICD-10-CM

## 2022-02-11 DIAGNOSIS — E78.5 DYSLIPIDEMIA, GOAL LDL BELOW 70: ICD-10-CM

## 2022-02-11 PROBLEM — E66.09 OBESITY DUE TO EXCESS CALORIES WITHOUT SERIOUS COMORBIDITY: Status: ACTIVE | Noted: 2022-02-11

## 2022-02-11 LAB — HBA1C MFR BLD: 6.8 %

## 2022-02-11 PROCEDURE — 99213 OFFICE O/P EST LOW 20 MIN: CPT | Performed by: INTERNAL MEDICINE

## 2022-02-11 PROCEDURE — 83036 HEMOGLOBIN GLYCOSYLATED A1C: CPT | Performed by: INTERNAL MEDICINE

## 2022-02-11 PROCEDURE — 3046F HEMOGLOBIN A1C LEVEL >9.0%: CPT | Performed by: INTERNAL MEDICINE

## 2022-02-11 PROCEDURE — 2022F DILAT RTA XM EVC RTNOPTHY: CPT | Performed by: INTERNAL MEDICINE

## 2022-02-11 PROCEDURE — 1036F TOBACCO NON-USER: CPT | Performed by: INTERNAL MEDICINE

## 2022-02-11 PROCEDURE — G8427 DOCREV CUR MEDS BY ELIG CLIN: HCPCS | Performed by: INTERNAL MEDICINE

## 2022-02-11 PROCEDURE — G8417 CALC BMI ABV UP PARAM F/U: HCPCS | Performed by: INTERNAL MEDICINE

## 2022-02-11 PROCEDURE — G8484 FLU IMMUNIZE NO ADMIN: HCPCS | Performed by: INTERNAL MEDICINE

## 2022-02-11 RX ORDER — FLASH GLUCOSE SENSOR
1 KIT MISCELLANEOUS
Qty: 2 EACH | Refills: 5 | Status: SHIPPED | OUTPATIENT
Start: 2022-02-11 | End: 2022-07-18 | Stop reason: SDUPTHER

## 2022-02-11 RX ORDER — PEN NEEDLE, DIABETIC 31 GX5/16"
NEEDLE, DISPOSABLE MISCELLANEOUS
COMMUNITY
Start: 2022-01-25 | End: 2022-07-18 | Stop reason: SDUPTHER

## 2022-02-11 RX ORDER — METFORMIN HYDROCHLORIDE 500 MG/1
TABLET, EXTENDED RELEASE ORAL
COMMUNITY
Start: 2022-01-24 | End: 2022-02-11

## 2022-02-11 NOTE — PROGRESS NOTES
[address] [facility]   [facility] [address]LOUISA, Sera Russell  Dept: [phone]  Dept Fax: [phone]        YOB: 1980    Chief Complaint   Patient presents with    Diabetes     Needs A1C, Eye Exam, andFoot Exam    Hyperlipidemia       Sienna Taylor presents for follow-up of his DM-2   Recently evaluated in our diabetic clinic, by Eron Galicia . He got the  [facility], as its cost effective vs Dexcom . Previous A1c is 9.4%,  7.6 % and today 6.8%  with normal BUN, Creat from 8/21. He is due for another by end of next month. Hx of DM-2   About 4-5 yrs , has family hx of DM. He follows up with Tre Vega CNP as PCP. He admits he is doing better on diet and taking insulin, no LOW sugars, he does have a aleja on line. NO CP or SOB, no recent fever or chills. He did not get the covid vaccine. He was a former smoker, quit 10 yrs ago. He got a foot exam by his  PCP, 8/21 . Reviewed his blood sugars ,  87% of the time he was in the target range , based on last 30 and 90 days which is great, overall nicely   Improved after the change in pharmacotherapy on the last visit. No recent hospitalizations. He did not get the covid vaccine, fortunately  is a non smoker. .       Patient Active Problem List   Diagnosis    Diabetes ([facility] [address]    Dyslipidemia, goal LDL below 70    Obesity due to excess calories without serious comorbidity       Current Outpatient Medications   Medication Sig Dispense Refill    B-D UF III MINI PEN NEEDLES 31G X 5 MM MISC       dapagliflozin (FARXIGA) 10 MG tablet Take 1 tablet by mouth every morning 90 tablet 0    SITagliptin (JANUVIA) 100 MG tablet Take 1 tablet by mouth once daily 30 tablet 0    Continuous Blood Gluc Sensor (FREESTYLE ALEJA 2 SENSOR) MISC 1 each by Does not apply route every 14 days 2 each 5    Continuous Blood Gluc  (FREESTYLE ALEJA 2 READER) LEEROY USE AS DIRECTED TO TEST BLOOD GLUCOSE.       insulin lispro, 1 Unit Dial, (HUMALOG KWIKPEN) 100 UNIT/ML SOPN 5 units Plus scale pre meals TID (Patient taking differently: 5 units Plus group scale 2 pre meals TID. max daily dose 70 units. ) 15 pen 5    metFORMIN, MOD, (GLUMETZA) 500 MG extended release tablet Take 1,000 mg by mouth 2 times daily (with meals)      atorvastatin (LIPITOR) 20 MG tablet Take 20 mg by mouth nightly      ramipril (ALTACE) 1.25 MG capsule Take 1.25 mg by mouth daily      insulin glargine (BASAGLAR KWIKPEN) 100 UNIT/ML injection pen Inject into the skin nightly Inject 30 units in the morning and 20 units bedtime       No current facility-administered medications for this visit.        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[employer]   Tobacco Use    Smoking status: Former Smoker     Packs/day: 1.00     Years: 15.00     Pack years: 15.00     Types: Cigarettes     Quit date: 2014     Years since quittin.5    Smokeless tobacco: Never Used   Substance and Sexual Activity    Alcohol use: No     Alcohol/week: 0.0 standard drinks    Drug use: No    Sexual activity: Not on file   Other Topics Concern    Not on file   Social History Narrative    Not on file     Social Determinants of Health     Financial Resource Strain: Low Risk     Difficulty of Paying Living Expenses: Not very hard   Food Insecurity: No Food Insecurity    Worried About Running Out of Food in the Last Year: Never true    Ashlee of Food in the Last Year: Never true Transportation Needs:     Lack of Transportation (Medical): Not on file    Lack of Transportation (Non-Medical):  Not on file   Physical Activity:     Days of Exercise per Week: Not on file    Minutes of Exercise per Session: Not on file   Stress:     Feeling of Stress : Not on file   Social Connections:     Frequency of Communication with Friends and Family: Not on file    Frequency of Social Gatherings with Friends and Family: Not on file    Attends Scientologist Services: Not on file    Active Member of [address] [place] [address] [employer] or Organizations: Not on file    Attends Club or Organization Meetings: Not on file    Marital Status: Not on file   Intimate Partner Violence:     Fear of Current or Ex-Partner: Not on file    Emotionally Abused: Not on file    Physically Abused: Not on file    Sexually Abused: Not on file   Housing Stability:     Unable to Pay for Housing in the Last Year: Not on file    Number of [place] in the Last Year: Not on file    Unstable Housing in the Last Year: Not on file       Family History   Problem Relation Age of Onset    Diabetes Mother     Heart Disease Father     Diabetes Maternal Grandfather     Cancer Paternal Grandmother     Heart Disease Paternal Grandfather        Review of Systems     See HPI     /78 (Site: Left Upper Arm)   Pulse 82   Temp 98 °F (36.7 °C)   Wt 257 lb 6.4 oz (116.8 kg)   BMI 34.91 kg/m²     Physical Examination: General appearance - alert, well appearing, and in no distress but obese WM  Mental status - alert, oriented to person, place, and time  Neck - supple, no significant adenopathy  Chest - clear to auscultation, no wheezes, rales or rhonchi, symmetric air entry  Heart - normal rate, regular rhythm, normal S1, S2, no murmurs, rubs, clicks or gallops  Abdomen - soft, nontender, nondistended, no masses or organomegaly  Neurological - alert, oriented, normal speech, no focal findings or movement disorder noted  Musculoskeletal - no joint tenderness, deformity or swelling  Extremities - peripheral pulses normal, no pedal edema, no clubbing or cyanosis  Skin - normal coloration and turgor, no rashes, no suspicious skin lesions noted     I have reviewed recent diagnostic testing including labs    Lab Results   Component Value Date     08/17/2021    K 3.8 08/17/2021     08/17/2021    CO2 28 08/17/2021    BUN 10 08/17/2021    CREATININE 0.88 08/17/2021    GLUCOSE 152 08/17/2021    GLUCOSE Negative 08/17/2021    CALCIUM 9.7 08/17/2021     Cholesterol panel optimal, total cholesterol 118, LDL of 62 and triglyceride 143 with an HDL of 27 is on the lower side       Orders Placed This Encounter   Procedures    Lipid Panel     Check 7-10 days pre office visit     Standing Status:   Future     Standing Expiration Date:   2/11/2023     Order Specific Question:   Is Patient Fasting?/# of Hours     Answer:   yes     Comments:   12 hours    Comprehensive Metabolic Panel     Fasting labs in am     Standing Status:   Future     Standing Expiration Date:   8/11/2022    Hemoglobin A1C     BNV     Standing Status:   Future     Standing Expiration Date:   6/11/2022    POCT glycosylated hemoglobin (Hb A1C)          Diagnosis Orders   1. Type 2 diabetes mellitus without complication, with long-term current use of insulin (HCC)  POCT glycosylated hemoglobin (Hb A1C)    Lipid Panel    Comprehensive Metabolic Panel    Hemoglobin A1C   2. Dyslipidemia, goal LDL below 70  Lipid Panel    Comprehensive Metabolic Panel   3. Class 3 severe obesity due to excess calories without serious comorbidity in adult, unspecified BMI (HCC)         PLAN:      DM-2     Continue with  basaglar to 33 units in am , and 20 u PM and humalog  And continue with  the jannuvia  100 mg daily and  added farxiga 10 mg daily previously ,Tolerating it well. Overall sugars have improved, and I congratulated him .  Once again A1c today is 6.8 %    He is working with his insurance co , re eye exam coverage currently does not have it. Strong dietary and  life style changes and regular exercise was recommended, and to lead an active life style . Counseling was done today,  Regarding a healthy diet and exercise, and healthy living with diabetes. Diabetic education material was provided to the patient on this visit. Foot exam after 8/22 . Urine micro alb and previous labs d/w pt. Dyslipidemia is doing well on the current regimen LDL total cholesterol adequately controlled per ADA guidelines, will continue with Lipitor 20 mg daily. Obtain labs pre next visit, CMP and lipids. He is on low-dose ramipril 1.25 mg we will continue with the same, as outlined above the recent microalbumin was noted which is within normal range, for renal protection. RTO  4 +  months , encouraged him to consider the covid vaccine due to   Benefits. Signed by : Monica Morris M.D.     [address] [facility]  2003 [facility], [address] [place] [address] Dr Madisyn Amin, [address]    Tel  [phone]  Fax [phone]

## 2022-04-20 RX ORDER — INSULIN LISPRO 100 [IU]/ML
INJECTION, SOLUTION INTRAVENOUS; SUBCUTANEOUS
Qty: 15 PEN | Refills: 5 | OUTPATIENT
Start: 2022-04-20

## 2022-04-20 RX ORDER — DAPAGLIFLOZIN 10 MG/1
TABLET, FILM COATED ORAL
Qty: 90 TABLET | Refills: 3 | OUTPATIENT
Start: 2022-04-20

## 2022-05-05 ENCOUNTER — OFFICE VISIT (OUTPATIENT)
Dept: INTERNAL MEDICINE CLINIC | Age: 42
End: 2022-05-05
Payer: COMMERCIAL

## 2022-05-05 VITALS
HEIGHT: 72 IN | TEMPERATURE: 97.4 F | DIASTOLIC BLOOD PRESSURE: 72 MMHG | SYSTOLIC BLOOD PRESSURE: 131 MMHG | BODY MASS INDEX: 34.13 KG/M2 | WEIGHT: 252 LBS | HEART RATE: 78 BPM

## 2022-05-05 DIAGNOSIS — Z79.4 TYPE 2 DIABETES MELLITUS WITHOUT COMPLICATION, WITH LONG-TERM CURRENT USE OF INSULIN (HCC): ICD-10-CM

## 2022-05-05 DIAGNOSIS — E11.9 TYPE 2 DIABETES MELLITUS WITHOUT COMPLICATION, WITH LONG-TERM CURRENT USE OF INSULIN (HCC): ICD-10-CM

## 2022-05-05 PROCEDURE — G0108 DIAB MANAGE TRN  PER INDIV: HCPCS | Performed by: INTERNAL MEDICINE

## 2022-05-05 NOTE — PROGRESS NOTES
[facility]  [facility] [address] [facility], [address]  [phone] (phone)  [phone] (fax)    Patient ID: Jacquelyn Karimi 1980  Referring Provider: Gillian WADE     Patient's name and  were verified. Subjective:    He presents for His follow-up diabetic visit. He has type 2 diabetes mellitus. Home regimen includes: Insulin, Biguanide, DDP-IV-1 and SGLT-2 inhibitors He is noncompliant some of the time. Assessment:     Lab Results   Component Value Date    LABA1C 6.8 2022    BUN 10 2021    CREATININE 0.88 2021     There were no vitals filed for this visit. Wt Readings from Last 3 Encounters:   22 257 lb 6.4 oz (116.8 kg)   21 257 lb 6.4 oz (116.8 kg)   21 262 lb 9.6 oz (119.1 kg)     Ht Readings from Last 3 Encounters:   21 6' (1.829 m)   21 6' (1.829 m)   21 6' (1.829 m)       Current monitoring regimen: home blood tests - 4+ times daily; Gabby CGM  Home blood sugar trends:   Any episodes of hypoglycemia? yes - 1 per month; highest risk 10p-12a. Treatment-glucose tabs  Depression screening completed 2021  Previous visit with dietician: yes - 21  Current diet: B Betina bar/ protein shake                       L roosters chicken wings/ cauliflower                       D fast food 2 small sandwiches or taco/ chalupa                       Snacks- none                       Drinks water/ crystal light  Current exercise: new puppy --walking more outdoors. 5,000 steps per day  Eye exam current (within one year): no; no appt made  Any history of foot problems? no  Last foot exam: 2022 Pedal pulses:   peripheral pulses symmetrical   Results of monofilament test:                Skin noted to be warm and hair is reduced. Right great toe with decreased sensation. Immunizations up to date: no; refuses  Taking ASA:  No - If not why? Not ordered  Appropriate for use of MyChart Glucose Grid:  Yes    Focus:     Diabetes education.  Recent A1C 6,8%. Weight is down approx 16 pounds! Em Prather has not been taking Humalog for the past 10 days as he ran out and is waiting for a new supply to arrive today. It is noted that without Humalog he has maintained 2hrpp glucose levels of 130-207 bkfst, 150-220's lunch, and 160-2540's dinner. With Humalog in place, he was achieving meal clearance at all bkfst, most lunches and some dinners. He continues with weight loss efforts and could benefit from GLP1 therapy, which would likely eliminate meal time insulin dosing. He is receptive to this approach. Will discuss with provider. Much encouragement given. Follow up 1 month with RD.           DSME PLAN:   Discussed general issues about diabetes pathophysiology and management. Counseling at today's visit: BG goals; carbs; exercise; med actions; weight loss benefits; treating/ reporting low BS. Great job with blood sugar control  Watch the response to your Humalog doses      Lunch blood sugar is from your breakfast Humalog dose                  -try 5 units for breakfast --goal is to have noon BS       Dinner blood sugar is from lunch Humalog dose               --is 8 units covering 50-60 grams carbohydrate                     Do you need 10  Units for 70-80 grams carb at lunch? Goal is to get dinner blood sugars        Bedtime blood sugar is from dinner Humalog           Is 8 units at dinner enough to keep bedtime blood sugars 100-140? Increase exercise goals. Try increasing steps per day to 7-8,000 steps              Per day                  And/ or set aside 10-15 minutes right after supper for exercise  We will ask Dr. Dat Swanson about Trulicity therapy  Always carry a treatment for low blood sugar    Meter download, medications, PMH and nursing assessment reviewed. Denise Nayak states He is willing to participate in this plan of care and verbalized understanding of all instructions provided. Teach back used to verify comprehension. Total time involved in direct patient education: 60 minutes.

## 2022-05-05 NOTE — PATIENT INSTRUCTIONS
Great job with blood sugar control  Watch the response to your Humalog doses      Lunch blood sugar is from your breakfast Humalog dose                  -try 5 units for breakfast --goal is to have noon BS       Dinner blood sugar is from lunch Humalog dose               --is 8 units covering 50-60 grams carbohydrate                     Do you need 10  Units for 70-80 grams carb at lunch? Goal is to get dinner blood sugars        Bedtime blood sugar is from dinner Humalog           Is 8 units at dinner enough to keep bedtime blood sugars 100-140? Increase exercise goals.  Try increasing steps per day to 7-8,000 steps              Per day                  And/ or set aside 10-15 minutes right after supper for exercise  We will ask Dr. Gino Peña about Trulicity therapy  Always carry a treatment for low blood sugar

## 2022-06-08 ENCOUNTER — OFFICE VISIT (OUTPATIENT)
Dept: INTERNAL MEDICINE CLINIC | Age: 42
End: 2022-06-08
Payer: COMMERCIAL

## 2022-06-08 VITALS — TEMPERATURE: 97 F | WEIGHT: 246.6 LBS | HEIGHT: 72 IN | BODY MASS INDEX: 33.4 KG/M2

## 2022-06-08 DIAGNOSIS — E11.9 TYPE 2 DIABETES MELLITUS WITHOUT COMPLICATION, WITH LONG-TERM CURRENT USE OF INSULIN (HCC): ICD-10-CM

## 2022-06-08 DIAGNOSIS — Z79.4 TYPE 2 DIABETES MELLITUS WITHOUT COMPLICATION, WITH LONG-TERM CURRENT USE OF INSULIN (HCC): ICD-10-CM

## 2022-06-08 PROCEDURE — 97803 MED NUTRITION INDIV SUBSEQ: CPT | Performed by: DIETITIAN, REGISTERED

## 2022-06-08 NOTE — PATIENT INSTRUCTIONS
Your budget per lunch and dinner:  60 gms carbs/meal. Max 75 gms carbs/meal.    Choose Grilled items instead of fried foods every time you go out to eat. - Choose lean protein more often - chicken turkey fish, salmon. Add fresh fruit serving and vegetable/salad with lunch and supper. Think of exercise plan - outside of normal routine of walking with work.

## 2022-06-08 NOTE — PROGRESS NOTES
[address] [place] [address]. [address] [place] [address] [place], [facility], [address]  [phone] (phone)  [phone] (fax)    Patient Name: Gerhardt Ruck. Date of Birth: [birth year]. MRN: [id number]      Assessment: Patient is a 42 y.o. male seen for follow-up MNT visit for Type 2 DB.     -Nutritionally relevant labs:   Lab Results   Component Value Date/Time    LABA1C 6.8 02/11/2022 10:56 AM    LABA1C 7.6 10/22/2021 12:00 AM    LABA1C 9.4 07/21/2021 12:00 AM    GLUCOSE 152 (H) 08/17/2021 09:35 AM    GLUCOSE Negative 08/17/2021 09:35 AM    GLUCOSE 251 (H) 12/09/2019 08:40 AM    CHOL 118 (L) 08/17/2021 09:35 AM    CHOL 180 03/21/2015 12:00 AM    HDL 27 (L) 08/17/2021 09:35 AM    LDLCALC 62 08/17/2021 09:35 AM    TRIG 143 08/17/2021 09:35 AM     5/6/22 per RN-CDE visit:  Shane rosairo with blood sugar control  Watch the response to your Humalog doses      Lunch blood sugar is from your breakfast Humalog dose                  -try 5 units for breakfast --goal is to have noon BS  - Doing 5 units plus ss at brkf. Lunch and dinner 8 plus ss      Dinner blood sugar is from lunch Humalog dose               --is 8 units covering 50-60 grams carbohydrate                     Do you need 10  Units for 70-80 grams carb at lunch? Goal is to get dinner blood sugars        Bedtime blood sugar is from dinner Humalog           Is 8 units at dinner enough to keep bedtime blood sugars 100-140? Increase exercise goals. Try increasing steps per day to 7-8,000 steps              Per day                  And/ or set aside 10-15 minutes right after supper for exercise  We will ask Dr. 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 Household - wife and twin boys 6 yr.  Wife works outside the home and boys will start  this year    Today's Visit:  Humalo u +ss @ brkf, 8 u +ss @ lunch and @ Dinner. Januvia 100 mg/daily  Farxiga:  10 mg    -Blood sugar trends: [employer] report - connected by phone. TIR:  80%, High 18%, very high: 2%    Pt lost 17# in 10 months. Pt states since last dietitian appt 10 months ago he has been staying away from fries and potatoes and watching portions.    -Food recall - Food log ([employer])  Breakfast: 7 am. Consistently consumes 34 gms carbs with Atkins bar (18 gm) and Equate Nutr supplement drink (16 gms carbs). He states he takes 5 units + ss with this brkf instead of 8 units plus ss. Other [place] Brkf Pizza (90 gms carbs & 60 gms fat & 2640 mg sodium)  Lunch: Noon - Eating out daily. Taco bell - 1 chipotle Ranch Sam Phillip and 1 Cheesy [place] crunch taco OR Jose Sanders's Ultimate porker and raisin oatmeal cookie (total carbs for this lunch 119 gms) OR Neeta's BBQ chicken salad with buttermilk dressing pkt. OR [employer] single sourdough melt & fried cheese curds OR excursion lunch - [employer]vers milkshake - 94 gms carbs, [place] Thickburger - (43 gms carbs, 50 gms fat, 1550 mg sodium)  Dinner: 6 pm.Velveeta mac and cheese (1/3 box = 49 gms carbs), 2 sl wheat bread (28 gms carbs) OR 2 sl pepperoni pizza OR 2 Mcchicken sandwiches (80 gms carbs, 1600 mg sodium)   Snacks: Does not snack    Pt states he has green beans sometimes at home with his supper meal but did not log this into his food logging briseyda. He has recently purchased berries and are available at home to eat. -Main Beverages: crystal light or water. occ coke zero - medium size or can once aday.     -Impression of Dietary Intake: on average, 3 meals per day, on average, 8 fast food meals per week, high fat/ cholesterol, high salt, lacking routine intake of fruits and vegetables    Current Outpatient Medications on File Prior to Visit   Medication Sig Dispense Refill    dapagliflozin (FARXIGA) 10 MG tablet Take 1 tablet by mouth every morning 90 tablet 1    SITagliptin (JANUVIA) 100 MG tablet Take 1 tablet by mouth once daily 90 tablet 2    B-D UF III MINI PEN NEEDLES 31G X 5 MM MISC       Continuous Blood Gluc Sensor (FREESTYLE CHRISTINA 2 SENSOR) MISC 1 each by Does not apply route every 14 days 2 each 5    Continuous Blood Gluc  (FREESTYLE CHRISTINA 2 READER) LEEROY USE AS DIRECTED TO TEST BLOOD GLUCOSE.  insulin lispro, 1 Unit Dial, (HUMALOG KWIKPEN) 100 UNIT/ML SOPN 5 units Plus scale pre meals TID (Patient taking differently: 8 units Plus group scale 2 pre meals TID. max daily dose 70 units. ) 15 pen 5    metFORMIN, MOD, (GLUMETZA) 500 MG extended release tablet Take 1,000 mg by mouth 2 times daily (with meals)      atorvastatin (LIPITOR) 20 MG tablet Take 20 mg by mouth nightly      ramipril (ALTACE) 1.25 MG capsule Take 1.25 mg by mouth daily      insulin glargine (BASAGLAR KWIKPEN) 100 UNIT/ML injection pen Inject into the skin nightly Inject 20 units in the morning and 20 units bedtime (Patient not taking: Reported on 6/8/2022)       No current facility-administered medications on file prior to visit. Vitals from current and previous visits:  Temp 97 °F (36.1 °C)   Ht 6' (1.829 m)   Wt 246 lb 9.6 oz (111.9 kg)   BMI 33.44 kg/m²     -Body mass index is 33.44 kg/m². 30-34.9 - Obesity Grade I.   - Patient lost and 17 pounds over the last 10 mo. .  -Weight goal: lose weight. Nutrition Diagnosis:   Obesity related to Excessive energy intake as evidenced by Food log and patient report of not exercising. Intervention:  -Impression: Pt did not relay much feedback about whether he would consider making healthier lower fat eating out choices. Pt. States he eats salads sometimes when eating out but food logging showed few times of doing so. Calorie intake is often quite high 2400 - 3100 calories/day, 100 - 120 gms fat/day and up to 300 gms carbs on some days/day.     -Instructed the patient on: Meal Planning for Regular, Balanced Meals & Snacks, The Importance of Regular Physical Activity, rationale for taking in fewer calories/day to help with his weight loss efforts and choosing heart healthy options when eating out to also help prevent heart disease.    -Handouts given for: eating on the run tip sheet, DB and taking care of your heart & eating well, fast food - healthier options chart. Patient Instructions   Your budget per lunch and dinner:  60 gms carbs/meal. Max 75 gms carbs/meal.    Choose Grilled items instead of fried foods every time you go out to eat. - Choose lean protein more often - chicken turkey fish, salmon. Add fresh fruit serving and vegetable/salad with lunch and supper. Think of exercise plan - outside of normal routine of walking with work. -Nutrition prescription: 1600 - 1800 calories/day, 180 - 200 g carbs/day. Comprehension verified using teachback method. Monitoring/Evaluation:   -Followup visit: declining dietitian f/u at this time.  -Receptiveness to education/goals: No Interest.  -Evaluation of education: Unsuccessful.  -Readiness to change: precontemplative- choosing grilled items when eating out and adding in more fresh fruits and vegetables. -Expected compliance: fair. Thank you for your referral of this patient. Total time involved in direct patient education: 45 minutes for follow-up MNT visit.

## 2022-07-14 LAB
ALBUMIN SERPL-MCNC: 4.9 G/DL (ref 3.5–5.2)
ALK PHOSPHATASE: 89 U/L (ref 40–119)
ALT SERPL-CCNC: 13 U/L (ref 5–50)
ANION GAP SERPL CALCULATED.3IONS-SCNC: 16 MEQ/L (ref 10–19)
AST SERPL-CCNC: 11 U/L (ref 9–50)
BILIRUB SERPL-MCNC: 0.2 MG/DL
BUN BLDV-MCNC: 19 MG/DL (ref 6–20)
CALCIUM SERPL-MCNC: 9.5 MG/DL (ref 8.5–10.5)
CHLORIDE BLD-SCNC: 100 MEQ/L (ref 95–107)
CHOLESTEROL/HDL RATIO: 4.1 RATIO
CHOLESTEROL: 131 MG/DL
CO2: 24 MEQ/L (ref 19–31)
CREAT SERPL-MCNC: 0.92 MG/DL (ref 0.8–1.4)
EGFR IF NONAFRICAN AMERICAN: 107 ML/MIN/1.73
GLUCOSE: 163 MG/DL (ref 70–99)
HDLC SERPL-MCNC: 32 MG/DL
LDL CHOLESTEROL CALCULATED: 69 MG/DL
LDL/HDL RATIO: 2.2 RATIO
POTASSIUM SERPL-SCNC: 4.1 MEQ/L (ref 3.5–5.4)
SODIUM BLD-SCNC: 140 MEQ/L (ref 133–146)
TOTAL PROTEIN: 7.2 G/DL (ref 6.1–8.3)
TRIGL SERPL-MCNC: 148 MG/DL
VLDLC SERPL CALC-MCNC: 30 MG/DL

## 2022-07-16 LAB
AVERAGE GLUCOSE: 157 MG/DL
HBA1C MFR BLD: 7.1 % (ref 4.2–5.6)

## 2022-07-18 ENCOUNTER — OFFICE VISIT (OUTPATIENT)
Dept: INTERNAL MEDICINE CLINIC | Age: 42
End: 2022-07-18
Payer: COMMERCIAL

## 2022-07-18 VITALS
WEIGHT: 243.2 LBS | TEMPERATURE: 98.6 F | SYSTOLIC BLOOD PRESSURE: 114 MMHG | BODY MASS INDEX: 32.98 KG/M2 | HEART RATE: 80 BPM | DIASTOLIC BLOOD PRESSURE: 80 MMHG

## 2022-07-18 DIAGNOSIS — E11.9 TYPE 2 DIABETES MELLITUS WITHOUT COMPLICATION, WITH LONG-TERM CURRENT USE OF INSULIN (HCC): Primary | ICD-10-CM

## 2022-07-18 DIAGNOSIS — E78.5 DYSLIPIDEMIA, GOAL LDL BELOW 70: ICD-10-CM

## 2022-07-18 DIAGNOSIS — Z79.4 TYPE 2 DIABETES MELLITUS WITHOUT COMPLICATION, WITH LONG-TERM CURRENT USE OF INSULIN (HCC): Primary | ICD-10-CM

## 2022-07-18 PROCEDURE — 99214 OFFICE O/P EST MOD 30 MIN: CPT | Performed by: INTERNAL MEDICINE

## 2022-07-18 PROCEDURE — 3051F HG A1C>EQUAL 7.0%<8.0%: CPT | Performed by: INTERNAL MEDICINE

## 2022-07-18 RX ORDER — INSULIN GLARGINE 100 [IU]/ML
INJECTION, SOLUTION SUBCUTANEOUS
Qty: 5 PEN | Refills: 3 | Status: SHIPPED | OUTPATIENT
Start: 2022-07-18 | End: 2022-07-20 | Stop reason: SDUPTHER

## 2022-07-18 RX ORDER — FLASH GLUCOSE SENSOR
1 KIT MISCELLANEOUS
Qty: 2 EACH | Refills: 5 | Status: SHIPPED | OUTPATIENT
Start: 2022-07-18 | End: 2022-07-20 | Stop reason: SDUPTHER

## 2022-07-18 RX ORDER — RAMIPRIL 1.25 MG/1
1.25 CAPSULE ORAL DAILY
Qty: 30 CAPSULE | Refills: 3 | Status: SHIPPED | OUTPATIENT
Start: 2022-07-18 | End: 2022-07-20 | Stop reason: SDUPTHER

## 2022-07-18 RX ORDER — INSULIN LISPRO 100 [IU]/ML
INJECTION, SOLUTION INTRAVENOUS; SUBCUTANEOUS
Qty: 15 PEN | Refills: 1 | Status: SHIPPED | OUTPATIENT
Start: 2022-07-18

## 2022-07-18 RX ORDER — METFORMIN HYDROCHLORIDE 500 MG/1
TABLET, EXTENDED RELEASE ORAL
COMMUNITY
Start: 2022-06-28

## 2022-07-18 RX ORDER — PEN NEEDLE, DIABETIC 31 GX5/16"
NEEDLE, DISPOSABLE MISCELLANEOUS
Qty: 450 EACH | Refills: 3 | Status: SHIPPED | OUTPATIENT
Start: 2022-07-18

## 2022-07-18 RX ORDER — ATORVASTATIN CALCIUM 20 MG/1
20 TABLET, FILM COATED ORAL NIGHTLY
Qty: 30 TABLET | Refills: 3 | Status: SHIPPED | OUTPATIENT
Start: 2022-07-18 | End: 2022-07-20 | Stop reason: SDUPTHER

## 2022-07-18 ASSESSMENT — PATIENT HEALTH QUESTIONNAIRE - PHQ9
SUM OF ALL RESPONSES TO PHQ9 QUESTIONS 1 & 2: 0
SUM OF ALL RESPONSES TO PHQ QUESTIONS 1-9: 0
1. LITTLE INTEREST OR PLEASURE IN DOING THINGS: 0
2. FEELING DOWN, DEPRESSED OR HOPELESS: 0
SUM OF ALL RESPONSES TO PHQ QUESTIONS 1-9: 0

## 2022-07-18 NOTE — PROGRESS NOTES
[address] [facility] Internal Medicine   [facility] [address]  [address] [facility], [address] [facility]  Dept: [phone]  Dept Fax: [phone]        YOB: 1980    Chief Complaint   Patient presents with    Diabetes    Hyperlipidemia       Shirley Brice presents for follow-up of his DM-2   Recently evaluated in our diabetic clinic, by Toshia Willett  and Danielle Hardy. He got the  Ramos, as its cost effective vs Dexcom . Previous A1c is 9.4%,  7.6 % and recent one on 7/14 is 7.1 % . Chyna Swann Hx of DM-2   About 4-5 yrs , has family hx of DM. He follows up with Kellie Zhao CNP as PCP. He admits he is doing better on diet and taking insulin, no LOW sugars, he does have a aleja on line. No low sugars or LOC. NO CP or SOB, no recent fever or chills. He did not get the covid vaccine. He was a former smoker, quit 10 yrs ago. He got a foot exam today, as outlined below. Reviewed his blood sugars ,  87% of the time he was in the target range , based on last 30 and 90 days which is great, overall nicely . Something fell on this right foot, about mid June he noted swelling of that. No falls or fever or chills. Improved after the change in pharmacotherapy on the last visit. No recent hospitalizations. He did not get the covid vaccine, fortunately  is a non smoker. .no recent hospitalizations, overall sugars are well controlled nearly 80-90 % in target range. He is not interested in getting covid vaccine, I encouraged him to re think about that, with up tick in the cases.        Patient Active Problem List   Diagnosis    Diabetes ([address]    Dyslipidemia, goal LDL below 70    Obesity due to excess calories without serious comorbidity       Current Outpatient Medications   Medication Sig Dispense Refill    metFORMIN (GLUCOPHAGE-XR) 500 MG extended release tablet TAKE 2 TABLETS BY MOUTH TWICE DAILY      dapagliflozin (FARXIGA) 10 MG tablet Take 1 tablet by mouth every morning 90 tablet 1    insulin lispro, 1 Unit Dial, (HUMALOG KWIKPEN) 100 UNIT/ML SOPN 8 units Plus group scale 2 pre meals TID. max daily dose 70 units. 15 pen 1    Continuous Blood Gluc Sensor (FREESTYLE CHRISTINA 2 SENSOR) MISC 1 each by Does not apply route every 14 days 2 each 5    ramipril (ALTACE) 1.25 MG capsule Take 1 capsule by mouth in the morning. 30 capsule 3    insulin glargine (BASAGLAR KWIKPEN) 100 UNIT/ML injection pen Inject 20 units in the morning and 20 units bedtime 5 pen 3    atorvastatin (LIPITOR) 20 MG tablet Take 1 tablet by mouth nightly 30 tablet 3    SITagliptin (JANUVIA) 100 MG tablet Take 1 tablet by mouth once daily 90 tablet 2    B-D UF III MINI PEN NEEDLES 31G X 5 MM MISC       Continuous Blood Gluc  (FREESTYLE CHRISTINA 2 READER) LEEROY USE AS DIRECTED TO TEST BLOOD GLUCOSE. No current facility-administered medications for this visit.        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[employer]   Tobacco Use    Smoking status: Former     Packs/day: 1.00     Years: 15.00     Pack years: 15.00     Types: Cigarettes     Quit date: 2014     Years since quittin.9    Smokeless tobacco: Never   Substance and Sexual Activity    Alcohol use: No     Alcohol/week: 0.0 standard drinks    Drug use: No    Sexual activity: Not on file   Other Topics Concern    Not on file   Social History Narrative    Not on file     Social Determinants of Health     Financial Resource Strain: Low Risk     Difficulty of Paying Living Expenses: Not very hard   Food Insecurity: No Food Insecurity    Worried About Running Out of Food in the Last Year: Never true    Ran Out of Food in the Last Year: Never true   Transportation Needs: Not on file   Physical Activity: Not on file   Stress: Not on file   Social Connections: Not on file   Intimate Partner Violence: Not on file   Housing Stability: Not on file       Family History   Problem Relation Age of Onset    Diabetes Mother     Heart Disease Father     Diabetes Maternal Grandfather     Cancer Paternal Grandmother     Heart Disease Paternal Grandfather        Review of Systems     See HPI     /80 (Site: Left Upper Arm)   Pulse 80   Temp 98.6 °F (37 °C)   Wt 243 lb 3.2 oz (110.3 kg)   BMI 32.98 kg/m²     Physical Examination: General appearance - alert, well appearing, and in no distress but obese WM  Mental status - alert, oriented to person, place, and time  Neck - supple, no significant adenopathy  Chest - clear to auscultation, no wheezes, rales or rhonchi, symmetric air entry  Heart - normal rate, regular rhythm, normal S1, S2, no murmurs, rubs, clicks or gallops  Abdomen - soft, nontender, nondistended, no masses or organomegaly  Neurological - alert, oriented, normal speech, no focal findings or movement disorder noted  Musculoskeletal - no joint tenderness, deformity or swelling  Extremities - peripheral pulses normal, no pedal edema, no clubbing or cyanosis  Skin - normal coloration and turgor, no rashes, no suspicious skin lesions noted     I have reviewed recent diagnostic testing including labs      Visual inspection:  Deformity/amputation: absent  Skin lesions/pre-ulcerative calluses: absent  Edema: right- negative, left- negative    Sensory exam:  Monofilament sensation: normal  (minimum of 5 random plantar locations tested, avoiding callused areas - > 1 area with absence of sensation is + for neuropathy)    Plus at least one of the following:  Pulses: normal,   Pinprick: Intact  Proprioception: Intact  Vibration (128 Hz): Intact     Lab Results   Component Value Date/Time     07/14/2022 08:11 AM    K 4.1 07/14/2022 08:11 AM     07/14/2022 08:11 AM    CO2 24 07/14/2022 08:11 AM    BUN 19 07/14/2022 08:11 AM    CREATININE 0.92 07/14/2022 08:11 AM    GLUCOSE 163 07/14/2022 08:11 AM    CALCIUM 9.5 07/14/2022 08:11 AM     Cholesterol panel optimal, total cholesterol 118, LDL of 62 and triglyceride 143 with an HDL of 27 is on the lower side       Orders Placed This Encounter   Procedures     DIABETES FOOT EXAM            Diagnosis Orders   1. Type 2 diabetes mellitus without complication, with long-term current use of insulin ([facility])   DIABETES FOOT EXAM      2. Dyslipidemia, goal LDL below 70              PLAN:      DM-2     Continue with  basaglar to 33 units in am , and 20 u PM and humalog  And continue with  the jannuvia  100 mg daily and  added farxiga 10 mg daily previously ,Tolerating it well. Overall sugars have improved, and I congratulated him . Once again A1c today is 6.8 %    He is working with his insurance co , re eye exam coverage currently does not have it. Strong dietary and  life style changes and regular exercise was recommended, and to lead an active life style . Counseling was done today,  Regarding a healthy diet and exercise, and healthy living with diabetes. Diabetic education material was provided to the patient on this visit. Foot exam after 8/22 . Urine micro alb and previous labs d/w pt. Dyslipidemia is doing well on the current regimen LDL total cholesterol adequately controlled per ADA guidelines, will continue with Lipitor 20 mg daily. , recent labs noted and d/w pt, no changes in the regimen. He is on low-dose ramipril 1.25 mg we will continue with the same, as outlined above the recent microalbumin was noted which is within normal range, for renal protection. RTO  4 +  months , encouraged him to consider the covid vaccine .      Signed by : Anthony Landa Joleen Woodard M.D.     [address] [facility] Internal Medicine  [address] [facility], [address] [place], [address]    Tel  [phone]  Fax [phone]

## 2022-07-20 RX ORDER — FLASH GLUCOSE SENSOR
1 KIT MISCELLANEOUS
Qty: 6 EACH | Refills: 1 | Status: SHIPPED | OUTPATIENT
Start: 2022-07-20 | End: 2022-07-21 | Stop reason: SDUPTHER

## 2022-07-20 RX ORDER — RAMIPRIL 1.25 MG/1
1.25 CAPSULE ORAL DAILY
Qty: 90 CAPSULE | Refills: 1 | Status: SHIPPED | OUTPATIENT
Start: 2022-07-20 | End: 2022-07-21 | Stop reason: SDUPTHER

## 2022-07-20 RX ORDER — INSULIN GLARGINE 100 [IU]/ML
INJECTION, SOLUTION SUBCUTANEOUS
Qty: 15 PEN | Refills: 1 | Status: SHIPPED | OUTPATIENT
Start: 2022-07-20 | End: 2022-07-21 | Stop reason: SDUPTHER

## 2022-07-20 RX ORDER — ATORVASTATIN CALCIUM 20 MG/1
20 TABLET, FILM COATED ORAL NIGHTLY
Qty: 90 TABLET | Refills: 1 | Status: SHIPPED | OUTPATIENT
Start: 2022-07-20 | End: 2022-07-21 | Stop reason: SDUPTHER

## 2022-07-21 RX ORDER — ATORVASTATIN CALCIUM 20 MG/1
20 TABLET, FILM COATED ORAL NIGHTLY
Qty: 90 TABLET | Refills: 1 | Status: SHIPPED | OUTPATIENT
Start: 2022-07-21

## 2022-07-21 RX ORDER — RAMIPRIL 1.25 MG/1
1.25 CAPSULE ORAL DAILY
Qty: 90 CAPSULE | Refills: 1 | Status: SHIPPED | OUTPATIENT
Start: 2022-07-21

## 2022-07-21 RX ORDER — INSULIN GLARGINE 100 [IU]/ML
INJECTION, SOLUTION SUBCUTANEOUS
Qty: 15 PEN | Refills: 1 | Status: SHIPPED | OUTPATIENT
Start: 2022-07-21 | End: 2022-09-26

## 2022-07-21 RX ORDER — FLASH GLUCOSE SENSOR
1 KIT MISCELLANEOUS
Qty: 6 EACH | Refills: 1 | Status: SHIPPED | OUTPATIENT
Start: 2022-07-21

## 2022-09-26 ENCOUNTER — PHARMACY VISIT (OUTPATIENT)
Dept: INTERNAL MEDICINE CLINIC | Age: 42
End: 2022-09-26
Payer: COMMERCIAL

## 2022-09-26 VITALS
WEIGHT: 250.4 LBS | DIASTOLIC BLOOD PRESSURE: 78 MMHG | SYSTOLIC BLOOD PRESSURE: 133 MMHG | BODY MASS INDEX: 33.96 KG/M2 | TEMPERATURE: 97.9 F | HEART RATE: 93 BPM

## 2022-09-26 DIAGNOSIS — E11.9 TYPE 2 DIABETES MELLITUS WITHOUT COMPLICATION, WITH LONG-TERM CURRENT USE OF INSULIN (HCC): Primary | ICD-10-CM

## 2022-09-26 DIAGNOSIS — Z79.4 TYPE 2 DIABETES MELLITUS WITHOUT COMPLICATION, WITH LONG-TERM CURRENT USE OF INSULIN (HCC): Primary | ICD-10-CM

## 2022-09-26 PROCEDURE — G0108 DIAB MANAGE TRN  PER INDIV: HCPCS | Performed by: INTERNAL MEDICINE

## 2022-09-26 NOTE — PROGRESS NOTES
[facility]  [facility] [address] [address] [address] [facility] [place], [address]  [phone] (phone)  [phone] (fax)    Patient ID: Trey Ogden 1980  Referring Provider: Solo Aguilar CNP; also sees Dr. Sal Patel     Patient's name and  were verified. Subjective:    He presents for His follow-up diabetic visit. He has type 2 diabetes mellitus. Home regimen includes: Insulin, Biguanide, DDP-IV-1, and SGLT-2 inhibitors He is compliant most of the time. Assessment:     Lab Results   Component Value Date/Time    LABA1C 7.1 2022 08:11 AM    BUN 19 2022 08:11 AM    CREATININE 0.92 2022 08:11 AM     Vitals:    22 0856   BP: 133/78   Pulse: 93   Temp: 97.9 °F (36.6 °C)   Weight: 250 lb 6.4 oz (113.6 kg)     Wt Readings from Last 3 Encounters:   22 250 lb 6.4 oz (113.6 kg)   22 243 lb 3.2 oz (110.3 kg)   22 246 lb 9.6 oz (111.9 kg)     Ht Readings from Last 3 Encounters:   22 6' (1.829 m)   22 6' (1.829 m)   21 6' (1.829 m)       Diabetes Pharmacotherapy:  Farxiga 10mg daily  Basaglar - not using for the past 4 months  Humalog 8-11 units before meals (mostly lunch, dinner)  Metformin XR 1000mg BID  Januvia 100mg daily    Glucose Trends:   Current monitoring regimen:   Home blood sugar trends: Freestyle Gabby 2 CGM - checks 4+ times daily   -V. High: 1%, High: 16%, Target: 83%, Low: 0%, V. Low: 0%   -Periodic post prandial spikes above 200  Any episodes of hypoglycemia? rarely   -Treats with candy    Lifestyle Factors:   Previous visit with dietician: yes - 2022  Current diet: B: Diabetic Nutrition shake, Atkins bar OR eggs + toast            L: varies - burger (no fries) + dt soda or water                       D: pasta w/ 1 cup noodles + green beans                       Snacks:  (HS) rarely, unless BG <100: mini candy bar, M& M s                       Beverages: water, dt Coke, Crystal Light  Current exercise:  walking at job site ; 3,000-10,000 steps/day at work    Health Maintenance:  Eye exam current (within one year): no, overdue  Any history of foot problems? yes  Foot exam up to date: yes Date: 7/2022, Dr. Vanessa Santos up to date:    Pneumonia: no   COVID-19: no  The 10-year ASCVD risk score (Mari WARD, et al., 2019) is: 1.9%  Last panel - LDL:   Lab Results   Component Value Date    LDLCALC 69 07/14/2022   Taking ASA:  No   Taking statin: Yes - atorvastatin  Last microalbumin/creatinine ratio: WNL 8/2021; ordered repeat   Taking ACE/ARB: Yes- ramipril    Focus:   Diabetes Education. Last A1C: 7.1% (7/14/22). CGM time in range is excellent at 83%. Chanada Clock has self-titrated off of Basaglar by modifying his diet; he has been taking Humalog only (plus oral meds) for the past several months. He expressed an interest in GLP-1 agonist therapy. This may allow Shelda Clock to be titrated completely off of insulin. Will start Trulicity and stop Januvia. Reviewed dosing, mechanism, and ADRs. Encouraged increased physical activity. Curriculum Area Focus: Retinal exams, Immunizations, Glucose checks/goals, and Physical activity goals  DSME PLAN:     Start taking Trulicity 7.01FY once weekly. Stop taking Januvia when you are out of the medication.   -You can back off on your Humalog as needed    2. On days when you are less active at work, try to get 20-30 minutes of exercise at home   -Elliptical, weights, exercise video, taking a walk    3. Time to get your diabetes eye exam scheduled - see list of local eye doctor    4. Consider the Prevnar 20 pneumonia vaccine and flu vaccine    5.  With your next set of labs, try to get your urine protein checked         Goals Addressed                   This Visit's Progress     60 grams carb per meal   On track     Blood Pressure < 140/90   133/78     Exercise-increase to at least 7,000- 8,00 steps per day(start 5/5/22)   Not on track     New goal 5/5/22 -increase steps to 7,000-8,000             Meter download, medications, PMH and nursing assessment reviewed. Tamara Bustamante states He is willing to participate in this plan of care and verbalized understanding of all instructions provided. Teach back used to verify comprehension. Follow-up: 2 months Dr. Thompson Goodson, 3 month DM Clinic RN    Total time involved in direct patient education: 60 minutes.      For [address] [facility] in place:  Yes  Recommendation Provided To: Patient/Caregiver: 3 via In person  Intervention Detail: Discontinued Rx: 1, reason: Unnecessary Med, Lab(s) Ordered, and New Rx: 1, reason: Patient Preference  Gap Closed?: No   Intervention Accepted By: Patient/Caregiver: 3  Time Spent (min): [address], PharmD, [facility]  Internal Medicine Clinical Pharmacist  [phone],

## 2022-09-26 NOTE — PATIENT INSTRUCTIONS
Start taking Trulicity 8.64IP once weekly. Stop taking Januvia when you are out of the medication.   -You can back off on your Humalog as needed    2. On days when you are less active at work, try to get 20-30 minutes of exercise at home   -Elliptical, weights, exercise video, taking a walk    3. Time to get your diabetes eye exam scheduled - see list of local eye doctor    4. Consider the Prevnar 20 pneumonia vaccine and flu vaccine    5.  With your next set of labs, try to get your urine protein checked

## 2022-11-21 ENCOUNTER — OFFICE VISIT (OUTPATIENT)
Dept: INTERNAL MEDICINE CLINIC | Age: 42
End: 2022-11-21
Payer: COMMERCIAL

## 2022-11-21 VITALS
OXYGEN SATURATION: 96 % | TEMPERATURE: 98.3 F | HEART RATE: 94 BPM | BODY MASS INDEX: 34.58 KG/M2 | SYSTOLIC BLOOD PRESSURE: 106 MMHG | WEIGHT: 255 LBS | DIASTOLIC BLOOD PRESSURE: 74 MMHG

## 2022-11-21 DIAGNOSIS — Z79.4 TYPE 2 DIABETES MELLITUS WITHOUT COMPLICATION, WITH LONG-TERM CURRENT USE OF INSULIN (HCC): Primary | ICD-10-CM

## 2022-11-21 DIAGNOSIS — E66.9 OBESITY (BMI 30-39.9): ICD-10-CM

## 2022-11-21 DIAGNOSIS — E11.9 TYPE 2 DIABETES MELLITUS WITHOUT COMPLICATION, WITH LONG-TERM CURRENT USE OF INSULIN (HCC): Primary | ICD-10-CM

## 2022-11-21 DIAGNOSIS — E78.5 DYSLIPIDEMIA, GOAL LDL BELOW 70: ICD-10-CM

## 2022-11-21 DIAGNOSIS — M79.604 PAIN OF RIGHT LOWER EXTREMITY: ICD-10-CM

## 2022-11-21 LAB — HBA1C MFR BLD: 6.8 % (ref 4.3–5.7)

## 2022-11-21 PROCEDURE — 1036F TOBACCO NON-USER: CPT | Performed by: INTERNAL MEDICINE

## 2022-11-21 PROCEDURE — G8484 FLU IMMUNIZE NO ADMIN: HCPCS | Performed by: INTERNAL MEDICINE

## 2022-11-21 PROCEDURE — 99214 OFFICE O/P EST MOD 30 MIN: CPT | Performed by: INTERNAL MEDICINE

## 2022-11-21 PROCEDURE — 2022F DILAT RTA XM EVC RTNOPTHY: CPT | Performed by: INTERNAL MEDICINE

## 2022-11-21 PROCEDURE — 83036 HEMOGLOBIN GLYCOSYLATED A1C: CPT | Performed by: INTERNAL MEDICINE

## 2022-11-21 PROCEDURE — G8417 CALC BMI ABV UP PARAM F/U: HCPCS | Performed by: INTERNAL MEDICINE

## 2022-11-21 PROCEDURE — G8427 DOCREV CUR MEDS BY ELIG CLIN: HCPCS | Performed by: INTERNAL MEDICINE

## 2022-11-21 PROCEDURE — 3051F HG A1C>EQUAL 7.0%<8.0%: CPT | Performed by: INTERNAL MEDICINE

## 2022-11-21 RX ORDER — INSULIN LISPRO 100 [IU]/ML
INJECTION, SOLUTION INTRAVENOUS; SUBCUTANEOUS
Qty: 15 ADJUSTABLE DOSE PRE-FILLED PEN SYRINGE | Refills: 1 | Status: SHIPPED | OUTPATIENT
Start: 2022-11-21

## 2022-11-21 RX ORDER — RAMIPRIL 1.25 MG/1
1.25 CAPSULE ORAL DAILY
Qty: 90 CAPSULE | Refills: 1 | Status: SHIPPED | OUTPATIENT
Start: 2022-11-21

## 2022-11-21 RX ORDER — ATORVASTATIN CALCIUM 20 MG/1
20 TABLET, FILM COATED ORAL NIGHTLY
Qty: 90 TABLET | Refills: 1 | Status: SHIPPED | OUTPATIENT
Start: 2022-11-21

## 2022-11-21 RX ORDER — FLASH GLUCOSE SENSOR
1 KIT MISCELLANEOUS
Qty: 6 EACH | Refills: 1 | Status: SHIPPED | OUTPATIENT
Start: 2022-11-21

## 2022-11-21 SDOH — ECONOMIC STABILITY: FOOD INSECURITY: WITHIN THE PAST 12 MONTHS, THE FOOD YOU BOUGHT JUST DIDN'T LAST AND YOU DIDN'T HAVE MONEY TO GET MORE.: NEVER TRUE

## 2022-11-21 SDOH — ECONOMIC STABILITY: FOOD INSECURITY: WITHIN THE PAST 12 MONTHS, YOU WORRIED THAT YOUR FOOD WOULD RUN OUT BEFORE YOU GOT MONEY TO BUY MORE.: NEVER TRUE

## 2022-11-21 ASSESSMENT — SOCIAL DETERMINANTS OF HEALTH (SDOH): HOW HARD IS IT FOR YOU TO PAY FOR THE VERY BASICS LIKE FOOD, HOUSING, MEDICAL CARE, AND HEATING?: NOT HARD AT ALL

## 2022-11-21 NOTE — PROGRESS NOTES
[address] [facility] Internal Medicine   [facility] [address]  [facility], [address]  Dept: [phone]  Dept Fax: [phone]        YOB: 1980    Chief Complaint   Patient presents with    Diabetes    Hyperlipidemia       Magi Coronel presents for follow-up of his DM-2   Recently evaluated in our diabetic clinic, by Herlinda uTrcios  and Nazia Sender. He got the  Gus Payan, as its cost effective vs Dexcom . Previous A1c is 9.4%,  and today its 6.8 %  About 4-5 yrs , has family hx of DM. He follows up with Brayan Mccray CNP as PCP. He admits he is doing better on diet and taking insulin, no LOW sugars, he does have a aleja on line. No SYNCOPE or hospitalizations. NO CP or SOB, no recent fever or chills. He did not get the covid vaccine. He was a former smoker, quit 10 yrs ago. He got a foot exam today, as outlined below. Reviewed his blood sugars ,  80% of the time he was in the target range , based on last 30 and 90 days which is great, overall nicely . Something fell on this right foot, about mid June he noted swelling of that. No falls or fever or chills. , at times he gets low sugars, once a month below 100, and the Suma Ibarra is really helping him, as  He checks 4-5 times per day. Improved after the change in pharmacotherapy on the last visit. No recent hospitalizations. He did not get the covid vaccine, fortunately  is a non smoker. .no recent hospitalizations, overall sugars are well controlled nearly 80-90 % in target range. He is not interested in getting covid vaccine, I encouraged him to re think about that, with up tick in the cases. And he does not take flu vaccine. Complains of Right foot pain, had a trauma in the past, felt he had a lump  At the bottom of the foot , anteriorly.  No recent falls,       Patient Active Problem List   Diagnosis    Diabetes ([address]    Dyslipidemia, goal LDL below 70    Obesity due to excess calories without serious comorbidity       Current Outpatient Medications Medication Sig Dispense Refill    Dulaglutide 0.75 MG/0.5ML SOPN Inject 0.75 mg into the skin once a week *Replaces Januvia 4 Adjustable Dose Pre-filled Pen Syringe 1    atorvastatin (LIPITOR) 20 MG tablet Take 1 tablet by mouth nightly 90 tablet 1    Continuous Blood Gluc Sensor (FREESTYLE CHRISTINA 2 SENSOR) MISC 1 each by Does not apply route every 14 days 6 each 1    ramipril (ALTACE) 1.25 MG capsule Take 1 capsule by mouth daily 90 capsule 1    dapagliflozin (FARXIGA) 10 MG tablet Take 1 tablet by mouth every morning 90 tablet 1    insulin lispro, 1 Unit Dial, (HUMALOG KWIKPEN) 100 UNIT/ML SOPN 8 units Plus group scale 2 pre meals TID. max daily dose 70 units. 15 Adjustable Dose Pre-filled Pen Syringe 1    metFORMIN (GLUCOPHAGE-XR) 500 MG extended release tablet TAKE 2 TABLETS BY MOUTH TWICE DAILY      Insulin Pen Needle 31G X 5 MM MISC 1 each by Does not apply route daily 100 each 3    B-D UF III MINI PEN NEEDLES 31G X 5 MM MISC Use 5 times daily with insulin pens. 450 each 3    Continuous Blood Gluc  (FREESTYLE CHRISTINA 2 READER) LEEROY USE AS DIRECTED TO TEST BLOOD GLUCOSE. No current facility-administered medications for this visit.        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[employer]   Tobacco Use    Smoking status: Former     Packs/day: 1.00     Years: 15.00     Pack years: 15.00     Types: Cigarettes     Quit date: 2014     Years since quittin.2    Smokeless tobacco: Never Substance and Sexual Activity    Alcohol use: No     Alcohol/week: 0.0 standard drinks    Drug use: No    Sexual activity: Not on file   Other Topics Concern    Not on file   Social History Narrative    Not on file     Social Determinants of Health     Financial Resource Strain: Low Risk     Difficulty of Paying Living Expenses: Not hard at all   Food Insecurity: No Food Insecurity    Worried About Running Out of Food in the Last Year: Never true    Ran Out of Food in the Last Year: Never true   Transportation Needs: Not on file   Physical Activity: Not on file   Stress: Not on file   Social Connections: Not on file   Intimate Partner Violence: Not on file   Housing Stability: Not on file       Family History   Problem Relation Age of Onset    Diabetes Mother     Heart Disease Father     Diabetes Maternal Grandfather     Cancer Paternal Grandmother     Heart Disease Paternal Grandfather        Review of Systems     See HPI     /74 (Site: Left Upper Arm)   Pulse 94   Temp 98.3 °F (36.8 °C)   Wt 255 lb (115.7 kg)   SpO2 96%   BMI 34.58 kg/m²     Physical Examination: General appearance - alert, well appearing, and in no distress but obese WM  Mental status - alert, oriented to person, place, and time  Neck - supple, no significant adenopathy  Chest - clear to auscultation, no wheezes, rales or rhonchi, symmetric air entry  Heart - normal rate, regular rhythm, normal S1, S2, no murmurs, rubs, clicks or gallops  Abdomen - soft, nontender, nondistended, no masses or organomegaly  Neurological - alert, oriented, normal speech, no focal findings or movement disorder noted  Musculoskeletal - no joint tenderness, deformity or swelling  Extremities - peripheral pulses normal, no pedal edema, no clubbing or cyanosis  Skin - normal coloration and turgor, no rashes, no suspicious skin lesions noted     I have reviewed recent diagnostic testing including labs      Foot exam done last time, and he plans on getting eye exam this yr. Lab Results   Component Value Date/Time     07/14/2022 08:11 AM    K 4.1 07/14/2022 08:11 AM     07/14/2022 08:11 AM    CO2 24 07/14/2022 08:11 AM    BUN 19 07/14/2022 08:11 AM    CREATININE 0.92 07/14/2022 08:11 AM    GLUCOSE 163 07/14/2022 08:11 AM    CALCIUM 9.5 07/14/2022 08:11 AM     Cholesterol panel optimal, total cholesterol 118, LDL of 62 and triglyceride 143 with an HDL of 27 is on the lower side       Orders Placed This Encounter   Procedures    XR FOOT RIGHT (MIN 3 VIEWS)     Standing Status:   Future     Standing Expiration Date:   11/21/2023     Order Specific Question:   Reason for exam:     Answer:   Right foot exam    Lipid Panel     Standing Status:   Future     Standing Expiration Date:   11/21/2023     Order Specific Question:   Is Patient Fasting?/# of Hours     Answer:   yes     Comments:   12 hours    Hepatic Function Panel     Standing Status:   Future     Standing Expiration Date:   11/21/2023    Hemoglobin A1C     Standing Status:   Future     Standing Expiration Date:   11/21/2023    POCT glycosylated hemoglobin (Hb A1C)            Diagnosis Orders   1. Type 2 diabetes mellitus without complication, with long-term current use of insulin (HCC)  POCT glycosylated hemoglobin (Hb A1C)    XR FOOT RIGHT (MIN 3 VIEWS)    Lipid Panel    Hepatic Function Panel    Hemoglobin A1C      2. Pain of right lower extremity        3. Dyslipidemia, goal LDL below 70  Lipid Panel    Hepatic Function Panel      4. Obesity (BMI 30-39. 9)  XR FOOT RIGHT (MIN 3 VIEWS)            PLAN:      DM-2     Continue with  basaglar to 33 units in am , and 20 u PM and humalog  And continue with  the jannuvia  100 mg daily and  added farxiga 10 mg daily previously ,Tolerating it well. Overall sugars have improved, and I congratulated him .  Once again A1c today is 6.8 %   Recent lipid panel is optimal. He is on low dose trulicity , will continue with the same considering today's labs    He is working with his insurance co , re eye exam coverage currently does not have it. Strong dietary and  life style changes and regular exercise was recommended, and to lead an active life style . Counseling was done today,  Regarding a healthy diet and exercise, and healthy living with diabetes. Diabetic education material was provided to the patient on this visit. Foot exam after 8/22 . Urine micro alb and previous labs d/w pt. Dyslipidemia is doing well on the current regimen LDL total cholesterol adequately controlled per ADA guidelines, will continue with Lipitor 20 mg daily. , recent labs noted and d/w pt, no changes in the regimen. He is on low-dose ramipril 1.25 mg we will continue with the same, as outlined above the recent microalbumin was noted which is within normal range, for renal protection. Right foot pain , hx of previous trauma but no surgery, he does have  Good pulses, no obvious deformity noted. Obtain a foot x ray of the Right  Foot. Weight loss and increase exercise has been recommended. , as his BMI is 34+    RTO  5 +  months , encouraged him to consider the covid vaccine . Labs pre next visit    Signed by : Ruth Morris M.D.     [facility] Internal Medicine  [address] [facility], [facility] [address]  [address] [facility], [facility]    Tel  [phone]  Fax [phone]

## 2022-12-30 ENCOUNTER — HOSPITAL ENCOUNTER (OUTPATIENT)
Dept: GENERAL RADIOLOGY | Age: 42
Discharge: HOME | End: 2022-12-30
Payer: COMMERCIAL

## 2022-12-30 ENCOUNTER — HOSPITAL ENCOUNTER (OUTPATIENT)
Age: 42
Discharge: HOME | End: 2022-12-30
Payer: COMMERCIAL

## 2022-12-30 DIAGNOSIS — E66.9 OBESITY (BMI 30-39.9): ICD-10-CM

## 2022-12-30 DIAGNOSIS — E11.9 TYPE 2 DIABETES MELLITUS WITHOUT COMPLICATION, WITH LONG-TERM CURRENT USE OF INSULIN (HCC): ICD-10-CM

## 2022-12-30 DIAGNOSIS — Z79.4 TYPE 2 DIABETES MELLITUS WITHOUT COMPLICATION, WITH LONG-TERM CURRENT USE OF INSULIN (HCC): ICD-10-CM

## 2022-12-30 PROCEDURE — 73630 X-RAY EXAM OF FOOT: CPT

## 2023-02-17 RX ORDER — METFORMIN HYDROCHLORIDE 500 MG/1
TABLET, EXTENDED RELEASE ORAL
Qty: 360 TABLET | Refills: 3 | Status: SHIPPED | OUTPATIENT
Start: 2023-02-17

## 2023-02-17 NOTE — TELEPHONE ENCOUNTER
Patient requesting refill for metformin. Date of last visit 11/21/22  Date of next visit 4/24/23    Pharmacy confirmed. Attached for your signature.

## 2023-04-21 LAB
ALBUMIN SERPL-MCNC: 4.9 G/DL (ref 3.5–5.2)
ALK PHOSPHATASE: 81 U/L (ref 40–119)
ALT SERPL-CCNC: 13 U/L (ref 5–50)
AST SERPL-CCNC: 8 U/L (ref 9–50)
AVERAGE GLUCOSE: 148 MG/DL
BILIRUB SERPL-MCNC: 0.3 MG/DL
BILIRUBIN DIRECT: <0.2 MG/DL (ref 0–0.3)
CHOLESTEROL/HDL RATIO: 3.7 RATIO
CHOLESTEROL: 122 MG/DL
HBA1C MFR BLD: 6.8 % (ref 4.2–5.6)
HDLC SERPL-MCNC: 33 MG/DL
LDL CHOLESTEROL CALCULATED: 57 MG/DL
LDL/HDL RATIO: 1.7 RATIO
TOTAL PROTEIN: 7.3 G/DL (ref 6.1–8.3)
TRIGL SERPL-MCNC: 159 MG/DL
VLDLC SERPL CALC-MCNC: 32 MG/DL

## 2023-08-21 RX ORDER — RAMIPRIL 1.25 MG/1
1.25 CAPSULE ORAL DAILY
Qty: 90 CAPSULE | Refills: 3 | Status: SHIPPED | OUTPATIENT
Start: 2023-08-21

## 2023-08-21 RX ORDER — ATORVASTATIN CALCIUM 20 MG/1
20 TABLET, FILM COATED ORAL NIGHTLY
Qty: 90 TABLET | Refills: 3 | Status: SHIPPED | OUTPATIENT
Start: 2023-08-21

## 2023-11-01 ENCOUNTER — OFFICE VISIT (OUTPATIENT)
Dept: INTERNAL MEDICINE CLINIC | Age: 43
End: 2023-11-01
Payer: COMMERCIAL

## 2023-11-01 VITALS
TEMPERATURE: 97.1 F | HEART RATE: 68 BPM | OXYGEN SATURATION: 96 % | WEIGHT: 250.6 LBS | HEIGHT: 72 IN | RESPIRATION RATE: 18 BRPM | DIASTOLIC BLOOD PRESSURE: 78 MMHG | BODY MASS INDEX: 33.94 KG/M2 | SYSTOLIC BLOOD PRESSURE: 126 MMHG

## 2023-11-01 DIAGNOSIS — E78.5 DYSLIPIDEMIA: ICD-10-CM

## 2023-11-01 DIAGNOSIS — Z79.4 TYPE 2 DIABETES MELLITUS WITHOUT COMPLICATION, WITH LONG-TERM CURRENT USE OF INSULIN (HCC): Primary | ICD-10-CM

## 2023-11-01 DIAGNOSIS — E11.9 TYPE 2 DIABETES MELLITUS WITHOUT COMPLICATION, WITH LONG-TERM CURRENT USE OF INSULIN (HCC): Primary | ICD-10-CM

## 2023-11-01 LAB — HBA1C MFR BLD: 6.2 % (ref 4.3–5.7)

## 2023-11-01 PROCEDURE — G8484 FLU IMMUNIZE NO ADMIN: HCPCS | Performed by: INTERNAL MEDICINE

## 2023-11-01 PROCEDURE — 3044F HG A1C LEVEL LT 7.0%: CPT | Performed by: INTERNAL MEDICINE

## 2023-11-01 PROCEDURE — 83036 HEMOGLOBIN GLYCOSYLATED A1C: CPT | Performed by: INTERNAL MEDICINE

## 2023-11-01 PROCEDURE — G8427 DOCREV CUR MEDS BY ELIG CLIN: HCPCS | Performed by: INTERNAL MEDICINE

## 2023-11-01 PROCEDURE — G8417 CALC BMI ABV UP PARAM F/U: HCPCS | Performed by: INTERNAL MEDICINE

## 2023-11-01 PROCEDURE — 2022F DILAT RTA XM EVC RTNOPTHY: CPT | Performed by: INTERNAL MEDICINE

## 2023-11-01 PROCEDURE — 99213 OFFICE O/P EST LOW 20 MIN: CPT | Performed by: INTERNAL MEDICINE

## 2023-11-01 PROCEDURE — 1036F TOBACCO NON-USER: CPT | Performed by: INTERNAL MEDICINE

## 2023-11-01 RX ORDER — INSULIN LISPRO 100 [IU]/ML
INJECTION, SOLUTION INTRAVENOUS; SUBCUTANEOUS
Qty: 15 ADJUSTABLE DOSE PRE-FILLED PEN SYRINGE | Refills: 1 | Status: SHIPPED | OUTPATIENT
Start: 2023-11-01

## 2023-11-01 SDOH — ECONOMIC STABILITY: INCOME INSECURITY: HOW HARD IS IT FOR YOU TO PAY FOR THE VERY BASICS LIKE FOOD, HOUSING, MEDICAL CARE, AND HEATING?: NOT HARD AT ALL

## 2023-11-01 SDOH — ECONOMIC STABILITY: HOUSING INSECURITY
IN THE LAST 12 MONTHS, WAS THERE A TIME WHEN YOU DID NOT HAVE A STEADY PLACE TO SLEEP OR SLEPT IN A SHELTER (INCLUDING NOW)?: NO

## 2023-11-01 SDOH — ECONOMIC STABILITY: FOOD INSECURITY: WITHIN THE PAST 12 MONTHS, THE FOOD YOU BOUGHT JUST DIDN'T LAST AND YOU DIDN'T HAVE MONEY TO GET MORE.: NEVER TRUE

## 2023-11-01 SDOH — ECONOMIC STABILITY: FOOD INSECURITY: WITHIN THE PAST 12 MONTHS, YOU WORRIED THAT YOUR FOOD WOULD RUN OUT BEFORE YOU GOT MONEY TO BUY MORE.: NEVER TRUE

## 2023-11-01 ASSESSMENT — PATIENT HEALTH QUESTIONNAIRE - PHQ9
1. LITTLE INTEREST OR PLEASURE IN DOING THINGS: 0
2. FEELING DOWN, DEPRESSED OR HOPELESS: 0
SUM OF ALL RESPONSES TO PHQ QUESTIONS 1-9: 0
SUM OF ALL RESPONSES TO PHQ9 QUESTIONS 1 & 2: 0
SUM OF ALL RESPONSES TO PHQ QUESTIONS 1-9: 0

## 2023-11-01 NOTE — PROGRESS NOTES
[facility] Internal Medicine   [facility] [place] [address] [address] [facility]  [place], [address] [facility],[address]  Dept: [phone]  Dept Fax: [phone]        YOB: 1980    Chief Complaint   Patient presents with    Diabetes    Dyslipidemia    Obesity       Rach Chua presents for follow-up of his DM-2   Recently evaluated in our diabetic clinic, by Dennis Hendrix  and OLLIE. He got the  [place], as its cost effective vs Dexcom . Previous A1c is 9.4%,  and today its 6.2 % , previous one 6.8   About 4-5 yrs , has family hx of DM. He follows up with Marko Aponte CNP as PCP. He admits he is doing better on diet and taking insulin, no LOW sugars, he does have a aleja on line. No SYNCOPE or hospitalizations. Hemoglobin A1C   Date Value Ref Range Status   11/01/2023 6.2 (H) 4.3 - 5.7 % Final     Comment:     Performed at [facility] under CLIA #  [id number]         NO CP or SOB, no recent fever or chills. He did not get the covid vaccine. He was a former smoker, quit 10 yrs ago. Reviewed his blood sugars ,  80% of the time he was in the target range , based on last 30 and 90 days which is great. GMI of 6.9 %%, and average glucose 151 on the [employer]. .  No falls or fever or chills. , at times he gets low sugars, once a month below 100, and the Allegra Nails is really helping him, as  He checks 4-5 times per day. He stopped taking the trulicity , as his insurance was not covering   Since 4/23. He was on jardiance in the past, but could not take them  Due to timing with meals etc, but no GI issues or fungal infection. No recent hospitalizations and no active cardiac problems.                Patient Active Problem List   Diagnosis    Diabetes ([address])    Dyslipidemia, goal LDL below 70    Obesity due to excess calories without serious comorbidity       Current Outpatient Medications   Medication Sig Dispense Refill    Continuous Blood Gluc Sensor (FREESTYLE ALEJA 2 SENSOR) MISC 1 each by Does not apply route every 14 days 6 each 1

## 2024-03-11 NOTE — TELEPHONE ENCOUNTER
Last ordered 2/17/23, disp 360, refill 3    Last visit 11/1:    DM-2      He is taking humalog insulin pre meals, and metformin    Overall sugars have improved, and I congratulated him . Once again A1c today is 6.2 % , weight loss was recommended.   No longer taking trulicity, due to problem with ins coverage.   He is working with his insurance co , re eye exam coverage currently does not have it. Strong dietary and  life style changes and regular exercise was recommended, and to lead an active life style . Counseling was done today,  Regarding a healthy diet and exercise, and healthy living with diabetes.   Diabetic education material was provided to the patient on this visit.  He does use Gabby  and he feels the CGM is helping him .     Next visit 3/21

## 2024-03-12 RX ORDER — METFORMIN HYDROCHLORIDE 500 MG/1
TABLET, EXTENDED RELEASE ORAL
Qty: 360 TABLET | Refills: 3 | Status: SHIPPED | OUTPATIENT
Start: 2024-03-12

## 2024-03-21 ENCOUNTER — OFFICE VISIT (OUTPATIENT)
Dept: INTERNAL MEDICINE CLINIC | Age: 44
End: 2024-03-21
Payer: COMMERCIAL

## 2024-03-21 VITALS
OXYGEN SATURATION: 97 % | SYSTOLIC BLOOD PRESSURE: 114 MMHG | RESPIRATION RATE: 18 BRPM | DIASTOLIC BLOOD PRESSURE: 70 MMHG | BODY MASS INDEX: 35.13 KG/M2 | TEMPERATURE: 98.1 F | HEART RATE: 86 BPM | WEIGHT: 259 LBS

## 2024-03-21 DIAGNOSIS — Z79.4 TYPE 2 DIABETES MELLITUS WITHOUT COMPLICATION, WITH LONG-TERM CURRENT USE OF INSULIN (HCC): Primary | ICD-10-CM

## 2024-03-21 DIAGNOSIS — E11.9 TYPE 2 DIABETES MELLITUS WITHOUT COMPLICATION, WITH LONG-TERM CURRENT USE OF INSULIN (HCC): Primary | ICD-10-CM

## 2024-03-21 DIAGNOSIS — E78.5 DYSLIPIDEMIA, GOAL LDL BELOW 70: ICD-10-CM

## 2024-03-21 LAB — HBA1C MFR BLD: 7.7 % (ref 4.3–5.7)

## 2024-03-21 PROCEDURE — 99214 OFFICE O/P EST MOD 30 MIN: CPT | Performed by: INTERNAL MEDICINE

## 2024-03-21 PROCEDURE — G8417 CALC BMI ABV UP PARAM F/U: HCPCS | Performed by: INTERNAL MEDICINE

## 2024-03-21 PROCEDURE — G8427 DOCREV CUR MEDS BY ELIG CLIN: HCPCS | Performed by: INTERNAL MEDICINE

## 2024-03-21 PROCEDURE — 3046F HEMOGLOBIN A1C LEVEL >9.0%: CPT | Performed by: INTERNAL MEDICINE

## 2024-03-21 PROCEDURE — 2022F DILAT RTA XM EVC RTNOPTHY: CPT | Performed by: INTERNAL MEDICINE

## 2024-03-21 PROCEDURE — 1036F TOBACCO NON-USER: CPT | Performed by: INTERNAL MEDICINE

## 2024-03-21 PROCEDURE — G8484 FLU IMMUNIZE NO ADMIN: HCPCS | Performed by: INTERNAL MEDICINE

## 2024-03-21 PROCEDURE — 83036 HEMOGLOBIN GLYCOSYLATED A1C: CPT | Performed by: INTERNAL MEDICINE

## 2024-03-21 RX ORDER — INSULIN LISPRO 100 [IU]/ML
INJECTION, SOLUTION INTRAVENOUS; SUBCUTANEOUS
Qty: 15 ADJUSTABLE DOSE PRE-FILLED PEN SYRINGE | Refills: 1 | Status: SHIPPED | OUTPATIENT
Start: 2024-03-21

## 2024-03-21 ASSESSMENT — PATIENT HEALTH QUESTIONNAIRE - PHQ9
SUM OF ALL RESPONSES TO PHQ QUESTIONS 1-9: 0
SUM OF ALL RESPONSES TO PHQ QUESTIONS 1-9: 0
SUM OF ALL RESPONSES TO PHQ9 QUESTIONS 1 & 2: 0
1. LITTLE INTEREST OR PLEASURE IN DOING THINGS: NOT AT ALL
SUM OF ALL RESPONSES TO PHQ QUESTIONS 1-9: 0
2. FEELING DOWN, DEPRESSED OR HOPELESS: NOT AT ALL
SUM OF ALL RESPONSES TO PHQ QUESTIONS 1-9: 0

## 2024-03-21 NOTE — PROGRESS NOTES
[facility] Internal Medicine   [address]  [place], [place] [address]  Dept: [phone]  Dept Fax: [phone]        YOB: 1980    Chief Complaint   Patient presents with    Diabetes    Dyslipidemia       Robby presents for follow-up of his DM-2   Recently evaluated in our diabetic clinic, by Shamar  and Katarzyna. He got the  Gabby, as its cost effective vs Dexcom . Previous A1c is 9.4%,  and  6.2  About 4-5 yrs , has family hx of DM. He follows up with Zahraa hughes CNP as PCP. He admits he is doing better on diet and taking insulin, no LOW sugars, he does have a gabby on line. No SYNCOPE or hospitalizations.     Patient does have a gabby we downloaded this over the last 28 days, the average blood sugar is 161.  He is in the target range of 70 to 180 71% of the time, in the higher range.  In 181 to 250   27% of the time no low sugars.  GMI 7.2%, with a glucose variability of 23.7.      TODAY'S A1c is 7.7%        NO CP or SOB, no recent fever or chills. He did not get the covid vaccine. He was a former smoker, quit 10 yrs ago.  He does check blood sugar several times because of the gabby.  He stopped taking the trulicity , as his insurance was not covering   Since 4/23. He was on jardiance in the past, but could not take them  Due to timing with meals etc, but no GI issues or fungal infection.   No recent hospitalizations and no active cardiac problems.     He was on trulicity, did well with weight loss again   Due to lack of insurance , last year it was stopped.  He is willing to try it again, if its covered this yr. He did put on some weight.           Patient Active Problem List   Diagnosis    Diabetes (HCC)    Dyslipidemia, goal LDL below 70    Obesity due to excess calories without serious comorbidity       Current Outpatient Medications   Medication Sig Dispense Refill    metFORMIN (GLUCOPHAGE-XR) 500 MG extended release tablet TAKE 2 TABLETS BY MOUTH TWICE DAILY 360

## 2024-05-10 LAB
ALBUMIN: 4.2 G/DL (ref 3.5–5.2)
ALK PHOSPHATASE: 72 U/L (ref 40–119)
ALT SERPL-CCNC: 13 U/L (ref 5–50)
ANION GAP SERPL CALCULATED.3IONS-SCNC: 11 MEQ/L (ref 7–16)
AST SERPL-CCNC: 10 U/L (ref 9–50)
BILIRUB SERPL-MCNC: 0.2 MG/DL
BUN BLDV-MCNC: 12 MG/DL (ref 6–20)
CALCIUM SERPL-MCNC: 9.4 MG/DL (ref 8.5–10.5)
CHLORIDE BLD-SCNC: 106 MEQ/L (ref 95–107)
CHOLESTEROL, TOTAL: 169 MG/DL
CHOLESTEROL/HDL RATIO: 4.8 RATIO
CO2: 25 MEQ/L (ref 19–31)
CREAT SERPL-MCNC: 0.87 MG/DL (ref 0.8–1.4)
CREATINE, URINE: 160.4 MG/DL
EGFR IF NONAFRICAN AMERICAN: 109 ML/MIN/1.73
GLUCOSE: 167 MG/DL (ref 70–99)
HDLC SERPL-MCNC: 35 MG/DL
LDL CHOLESTEROL: 107 MG/DL
LDL/HDL RATIO: 3.1 RATIO
MICROALBUMIN/CREAT 24H UR: <1.2 MG/DL
MICROALBUMIN/CREAT UR-RTO: NORMAL MG/G
POTASSIUM SERPL-SCNC: 4.6 MEQ/L (ref 3.5–5.4)
SODIUM BLD-SCNC: 142 MEQ/L (ref 133–146)
TOTAL PROTEIN: 6.9 G/DL (ref 6.1–8.3)
TRIGL SERPL-MCNC: 137 MG/DL
VLDLC SERPL CALC-MCNC: 27 MG/DL

## 2024-05-13 ENCOUNTER — OFFICE VISIT (OUTPATIENT)
Dept: INTERNAL MEDICINE CLINIC | Age: 44
End: 2024-05-13

## 2024-05-13 VITALS
HEART RATE: 72 BPM | DIASTOLIC BLOOD PRESSURE: 74 MMHG | TEMPERATURE: 98.3 F | WEIGHT: 256.2 LBS | SYSTOLIC BLOOD PRESSURE: 130 MMHG | BODY MASS INDEX: 34.75 KG/M2

## 2024-05-13 DIAGNOSIS — E11.40 TYPE 2 DIABETES MELLITUS WITH DIABETIC NEUROPATHY, WITH LONG-TERM CURRENT USE OF INSULIN (HCC): Primary | ICD-10-CM

## 2024-05-13 DIAGNOSIS — Z79.4 TYPE 2 DIABETES MELLITUS WITH DIABETIC NEUROPATHY, WITH LONG-TERM CURRENT USE OF INSULIN (HCC): Primary | ICD-10-CM

## 2024-05-13 PROCEDURE — NBSRV NON-BILLABLE SERVICE

## 2024-05-13 RX ORDER — TIRZEPATIDE 5 MG/.5ML
5 INJECTION, SOLUTION SUBCUTANEOUS WEEKLY
Qty: 4 ADJUSTABLE DOSE PRE-FILLED PEN SYRINGE | Refills: 1 | Status: SHIPPED | OUTPATIENT
Start: 2024-05-13 | End: 2024-05-15 | Stop reason: SDUPTHER

## 2024-05-13 RX ORDER — TIRZEPATIDE 2.5 MG/.5ML
2.5 INJECTION, SOLUTION SUBCUTANEOUS WEEKLY
Qty: 4 EACH | Refills: 0 | Status: SHIPPED | OUTPATIENT
Start: 2024-05-13 | End: 2024-05-15 | Stop reason: SDUPTHER

## 2024-05-13 NOTE — PATIENT INSTRUCTIONS
Pharmacist will work on getting one of the once weekly shots approved  -Decrease Humalog from 8 units to 6 units before meals once started    2. Time to get scheduled for a diabetes eye exam    3. Consider the Prevnar 20 pneumonia vaccine at your local pharmacy

## 2024-05-13 NOTE — PROGRESS NOTES
areas WNL. Encouraged diabetes eye exam and pneumonia vaccination.     Curriculum Area Focus: Foot care, Retinal exams, Immunizations, Glucose checks/goals, Carbohydrate counting/meal planning, and Physical activity goals  DSME PLAN:     Patient Instructions   Pharmacist will work on getting one of the once weekly shots approved  -Decrease Humalog from 8 units to 6 units before meals once started    2. Time to get scheduled for a diabetes eye exam    3. Consider the Prevnar 20 pneumonia vaccine at your local pharmacy         Goals Addressed                   This Visit's Progress     60 grams carb per meal   Not on track     Blood Pressure < 130/80   130/74     Exercise-increase to at least 7,000- 8,00 steps per day(start 5/5/22)   Not on track     New goal 5/5/22 -increase steps to 7,000-8,000       HEMOGLOBIN A1C < 7                Meter download, medications, PMH and nursing assessment reviewed.  Robby He states He is willing to participate in this plan of care and verbalized understanding of all instructions provided. Teach back used to verify comprehension.      Follow-up: 2 month Dr. Romo, 3 month [facility]     Total time involved in direct patient education: 60 minutes.   Individual Education appointment justified due to: Class Availability    For Pharmacy Admin Tracking Only    Program: Medical Group  CPA in place:  Yes  Recommendation Provided To: Patient/Caregiver: 3 via In person  Intervention Detail: Dose Adjustment: 2, reason: Therapy De-escalation, Therapy Optimization and Lab(s) Ordered  Intervention Accepted By: Patient/Caregiver: 3  Gap Closed?: Yes   Time Spent (min): 60        Katarzyna Aguirre, PharmD, BCPS, BC-[facility]  Internal Medicine Clinical Pharmacist  [phone]

## 2024-05-15 ENCOUNTER — TELEPHONE (OUTPATIENT)
Dept: INTERNAL MEDICINE CLINIC | Age: 44
End: 2024-05-15

## 2024-05-15 DIAGNOSIS — Z79.4 TYPE 2 DIABETES MELLITUS WITH DIABETIC NEUROPATHY, WITH LONG-TERM CURRENT USE OF INSULIN (HCC): Primary | ICD-10-CM

## 2024-05-15 DIAGNOSIS — E11.40 TYPE 2 DIABETES MELLITUS WITH DIABETIC NEUROPATHY, WITH LONG-TERM CURRENT USE OF INSULIN (HCC): Primary | ICD-10-CM

## 2024-05-15 RX ORDER — TIRZEPATIDE 5 MG/.5ML
5 INJECTION, SOLUTION SUBCUTANEOUS WEEKLY
Qty: 4 ADJUSTABLE DOSE PRE-FILLED PEN SYRINGE | Refills: 1 | Status: SHIPPED | OUTPATIENT
Start: 2024-06-12

## 2024-05-15 RX ORDER — TIRZEPATIDE 2.5 MG/.5ML
2.5 INJECTION, SOLUTION SUBCUTANEOUS WEEKLY
Qty: 4 EACH | Refills: 0 | Status: SHIPPED | OUTPATIENT
Start: 2024-05-15

## 2024-05-21 DIAGNOSIS — E11.9 TYPE 2 DIABETES MELLITUS WITHOUT COMPLICATION, WITH LONG-TERM CURRENT USE OF INSULIN (HCC): ICD-10-CM

## 2024-05-21 DIAGNOSIS — Z79.4 TYPE 2 DIABETES MELLITUS WITHOUT COMPLICATION, WITH LONG-TERM CURRENT USE OF INSULIN (HCC): ICD-10-CM

## 2024-07-25 ENCOUNTER — OFFICE VISIT (OUTPATIENT)
Dept: INTERNAL MEDICINE CLINIC | Age: 44
End: 2024-07-25
Payer: COMMERCIAL

## 2024-07-25 VITALS
RESPIRATION RATE: 18 BRPM | TEMPERATURE: 97.2 F | DIASTOLIC BLOOD PRESSURE: 76 MMHG | OXYGEN SATURATION: 96 % | SYSTOLIC BLOOD PRESSURE: 116 MMHG | HEART RATE: 66 BPM

## 2024-07-25 DIAGNOSIS — E11.40 TYPE 2 DIABETES MELLITUS WITH DIABETIC NEUROPATHY, WITH LONG-TERM CURRENT USE OF INSULIN (HCC): ICD-10-CM

## 2024-07-25 DIAGNOSIS — E11.9 TYPE 2 DIABETES MELLITUS WITHOUT COMPLICATION, WITH LONG-TERM CURRENT USE OF INSULIN (HCC): Primary | ICD-10-CM

## 2024-07-25 DIAGNOSIS — Z79.4 TYPE 2 DIABETES MELLITUS WITHOUT COMPLICATION, WITH LONG-TERM CURRENT USE OF INSULIN (HCC): Primary | ICD-10-CM

## 2024-07-25 DIAGNOSIS — Z79.4 TYPE 2 DIABETES MELLITUS WITH DIABETIC NEUROPATHY, WITH LONG-TERM CURRENT USE OF INSULIN (HCC): ICD-10-CM

## 2024-07-25 DIAGNOSIS — E78.5 DYSLIPIDEMIA: ICD-10-CM

## 2024-07-25 LAB — HBA1C MFR BLD: 7.1 % (ref 4.3–5.7)

## 2024-07-25 PROCEDURE — 99214 OFFICE O/P EST MOD 30 MIN: CPT | Performed by: INTERNAL MEDICINE

## 2024-07-25 PROCEDURE — 2022F DILAT RTA XM EVC RTNOPTHY: CPT | Performed by: INTERNAL MEDICINE

## 2024-07-25 PROCEDURE — G8427 DOCREV CUR MEDS BY ELIG CLIN: HCPCS | Performed by: INTERNAL MEDICINE

## 2024-07-25 PROCEDURE — 1036F TOBACCO NON-USER: CPT | Performed by: INTERNAL MEDICINE

## 2024-07-25 PROCEDURE — 3051F HG A1C>EQUAL 7.0%<8.0%: CPT | Performed by: INTERNAL MEDICINE

## 2024-07-25 PROCEDURE — 83036 HEMOGLOBIN GLYCOSYLATED A1C: CPT | Performed by: INTERNAL MEDICINE

## 2024-07-25 PROCEDURE — G8417 CALC BMI ABV UP PARAM F/U: HCPCS | Performed by: INTERNAL MEDICINE

## 2024-07-25 RX ORDER — ATORVASTATIN CALCIUM 20 MG/1
20 TABLET, FILM COATED ORAL NIGHTLY
Qty: 90 TABLET | Refills: 3 | Status: SHIPPED | OUTPATIENT
Start: 2024-07-25

## 2024-07-25 RX ORDER — RAMIPRIL 1.25 MG/1
1.25 CAPSULE ORAL DAILY
Qty: 90 CAPSULE | Refills: 3 | Status: SHIPPED | OUTPATIENT
Start: 2024-07-25

## 2024-07-25 RX ORDER — INSULIN LISPRO 100 [IU]/ML
INJECTION, SOLUTION INTRAVENOUS; SUBCUTANEOUS
Qty: 15 ADJUSTABLE DOSE PRE-FILLED PEN SYRINGE | Refills: 1 | Status: SHIPPED | OUTPATIENT
Start: 2024-07-25

## 2024-07-25 RX ORDER — TIRZEPATIDE 5 MG/.5ML
5 INJECTION, SOLUTION SUBCUTANEOUS WEEKLY
Qty: 4 ADJUSTABLE DOSE PRE-FILLED PEN SYRINGE | Refills: 1 | Status: CANCELLED | OUTPATIENT
Start: 2024-07-25

## 2024-07-25 NOTE — PROGRESS NOTES
Continuous Glucose Sensor (FREESTYLE CHRISTINA 2 SENSOR) MISC 1 each by Does not apply route every 14 days 6 each 1    insulin lispro, 1 Unit Dial, (HUMALOG KWIKPEN) 100 UNIT/ML SOPN 8 units Plus group scale 2 pre meals TID. max daily dose 70 units. 15 Adjustable Dose Pre-filled Pen Syringe 1    ramipril (ALTACE) 1.25 MG capsule Take 1 capsule by mouth daily 90 capsule 3    empagliflozin (JARDIANCE) 10 MG tablet Take 1 tablet by mouth daily 30 tablet 3    Insulin Pen Needle 31G X 5 MM MISC 1 each by Does not apply route daily 100 each 3    metFORMIN (GLUCOPHAGE-XR) 500 MG extended release tablet TAKE 2 TABLETS BY MOUTH TWICE DAILY 360 tablet 3    B-D UF III MINI PEN NEEDLES 31G X 5 MM MISC Use 5 times daily with insulin pens. 450 each 3    Continuous Blood Gluc  (FREESTYLE CHRISTINA 2 READER) LEEROY USE AS DIRECTED TO TEST BLOOD GLUCOSE.      Tirzepatide (MOUNJARO) 2.5 MG/0.5ML SOPN SC injection Inject 0.5 mLs into the skin once a week For 4 weeks, then increase to the 5mg dose. (Patient not taking: Reported on 7/25/2024) 4 each 0    Tirzepatide (MOUNJARO) 5 MG/0.5ML SOPN SC injection Inject 0.5 mLs into the skin once a week (Patient not taking: Reported on 7/25/2024) 4 Adjustable Dose Pre-filled Pen Syringe 1     No current facility-administered medications for this visit.       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

## 2024-08-23 LAB
ANION GAP SERPL CALCULATED.3IONS-SCNC: 13 MEQ/L (ref 7–16)
BUN BLDV-MCNC: 14 MG/DL (ref 6–20)
CALCIUM SERPL-MCNC: 9.6 MG/DL (ref 8.5–10.5)
CHLORIDE BLD-SCNC: 104 MEQ/L (ref 95–107)
CO2: 26 MEQ/L (ref 19–31)
CREAT SERPL-MCNC: 0.98 MG/DL (ref 0.8–1.4)
EGFR IF NONAFRICAN AMERICAN: 98 ML/MIN/1.73
GLUCOSE: 258 MG/DL (ref 70–99)
POTASSIUM SERPL-SCNC: 5.4 MEQ/L (ref 3.5–5.4)
SODIUM BLD-SCNC: 143 MEQ/L (ref 133–146)

## 2024-08-25 RX ORDER — BEXAGLIFLOZIN 20 MG
20 TABLET ORAL
Qty: 30 TABLET | Refills: 5 | Status: SHIPPED | OUTPATIENT
Start: 2024-08-25 | End: 2024-09-24

## 2024-08-27 DIAGNOSIS — T88.7XXA MEDICATION SIDE EFFECT: Primary | ICD-10-CM

## 2024-08-27 RX ORDER — BEXAGLIFLOZIN 20 MG
20 TABLET ORAL
Qty: 30 TABLET | Refills: 5 | Status: SHIPPED | OUTPATIENT
Start: 2024-08-27

## 2024-08-28 ENCOUNTER — OFFICE VISIT (OUTPATIENT)
Dept: INTERNAL MEDICINE CLINIC | Age: 44
End: 2024-08-28
Payer: COMMERCIAL

## 2024-08-28 VITALS
HEIGHT: 72 IN | SYSTOLIC BLOOD PRESSURE: 122 MMHG | WEIGHT: 252.4 LBS | HEART RATE: 81 BPM | DIASTOLIC BLOOD PRESSURE: 68 MMHG | TEMPERATURE: 96.4 F | BODY MASS INDEX: 34.19 KG/M2

## 2024-08-28 DIAGNOSIS — E11.9 TYPE 2 DIABETES MELLITUS WITHOUT COMPLICATION, WITH LONG-TERM CURRENT USE OF INSULIN (HCC): Primary | ICD-10-CM

## 2024-08-28 DIAGNOSIS — Z79.4 TYPE 2 DIABETES MELLITUS WITHOUT COMPLICATION, WITH LONG-TERM CURRENT USE OF INSULIN (HCC): Primary | ICD-10-CM

## 2024-08-28 PROCEDURE — NBSRV NON-BILLABLE SERVICE: Performed by: REGISTERED NURSE

## 2024-08-28 PROCEDURE — G0108 DIAB MANAGE TRN  PER INDIV: HCPCS | Performed by: REGISTERED NURSE

## 2024-08-28 NOTE — PATIENT INSTRUCTIONS
Abdoul Bales [employer]  [phone]          Did they get your prescription for Brenzavvy? Cost? When will it be sent?  Think about evening activity; maybe with your sons; to help with overnight blood sugars  Check on the Marita assisance program for Ozempic (once weekly shot)  Good job with meal coverage using your Humalog         Change Humalog to 9 units plus the group 2 scale for blood sugar before eating  Blood Sugar Dose fast acting insulin    None   140-199 2 unit   200-249 4 units   250-299 6 units   300-349 8 units   350-399 10 units   400 and above 12 units   Give your Humalog 15-20 minutes before you start to eat when you can!

## 2024-08-28 NOTE — PROGRESS NOTES
[facility]  [address] [place] [address]  [phone] (phone)  [phone] (fax)    Patient ID: Robby He 1980  Referring Provider: Dr. Clarissa Chavez CNP     Patient's name and  were verified.    Subjective:    He presents for a follow-up diabetic visit. He has type 2 diabetes mellitus. He is compliant some of the time.  Assessment:     Lab Results   Component Value Date/Time    LABA1C 7.1 2024 07:45 AM    LABA1C 6.8 2023 08:10 AM    BUN 14 2024 07:57 AM    CREATININE 0.98 2024 07:57 AM     Vitals:    24 0847   BP: 122/68   Site: Left Upper Arm   Position: Sitting   Pulse: 81   Temp: (!) 96.4 °F (35.8 °C)   Weight: 114.5 kg (252 lb 6.4 oz)   Height: 1.829 m (6')     Wt Readings from Last 3 Encounters:   24 114.5 kg (252 lb 6.4 oz)   24 116.2 kg (256 lb 3.2 oz)   24 117.5 kg (259 lb)     Ht Readings from Last 3 Encounters:   24 1.829 m (6')   23 1.829 m (6')   22 1.829 m (6')     Est, Glom Filt Rate   Date Value Ref Range Status   2015 96  Final         Diabetes Pharmacotherapy:  Basaglar -not taking  Humalog 8 units with each meal =bryce group 2 scale  Metfromin XR 500mg 2 tabs BID    Glucose Trends:   Current monitoring regimen: Freestyle Gabby 2 CGM - checks 4+ times daily  Home blood sugar trends:    -V. High: 6%, High: 3%, Target: 41%, Low: 0%, V. Low: 0%   -Average Glucose: 192mg/dL; GMI: 7.9%;  %time CGM active 89%              -overall, glucose levels running above goal. Bkfst meal clearance greatest challenge, but all meals could use a boost in clearance.  Any episodes of hypoglycemia? no   -Treats with glucose tablets    Lifestyle Factors:   Previous visit with dietician: yes - 2022  Current diet: B: lemon cake            L: 6\" cheese steak wheat bread (subway)                       D: grilled cheese/ fruit cup                       Snacks: denies                       Beverages: Crystal Light

## 2024-11-26 DIAGNOSIS — E11.9 TYPE 2 DIABETES MELLITUS WITHOUT COMPLICATION, WITH LONG-TERM CURRENT USE OF INSULIN (HCC): ICD-10-CM

## 2024-11-26 DIAGNOSIS — Z79.4 TYPE 2 DIABETES MELLITUS WITHOUT COMPLICATION, WITH LONG-TERM CURRENT USE OF INSULIN (HCC): ICD-10-CM

## 2024-11-27 RX ORDER — INSULIN LISPRO 100 [IU]/ML
INJECTION, SOLUTION INTRAVENOUS; SUBCUTANEOUS
Qty: 10 ADJUSTABLE DOSE PRE-FILLED PEN SYRINGE | Refills: 0 | Status: SHIPPED | OUTPATIENT
Start: 2024-11-27

## 2024-11-27 NOTE — TELEPHONE ENCOUNTER
Patient requesting one month supply to [facility].     Last ordered 7/25, disp 15 pens, refill 5    Last visit 7/25:     DM-2      He is taking humalog insulin pre meals, and metformin    Overall sugars have improved, and I congratulated him . He wants to get back on similar agent, so we will go ahead with Mounjaro, and recommended weight loss and dietary modification.  Once again he is off the GLP-1 agents because he is not able to get them through his insurance and they are expensive, initially Trulicity then Mounjaro.  Plan on adding Jardiance 10 mg daily 2 weeks samples were given subsequently spoke about the side effect profile he will get a BMP in about 4 weeks.  Will increase to 25 mg as tolerated , patient understood the above plan and he will call if he has any issues.    Next visit 1/7

## 2025-01-06 ASSESSMENT — PATIENT HEALTH QUESTIONNAIRE - PHQ9
SUM OF ALL RESPONSES TO PHQ9 QUESTIONS 1 & 2: 0
2. FEELING DOWN, DEPRESSED OR HOPELESS: NOT AT ALL
SUM OF ALL RESPONSES TO PHQ QUESTIONS 1-9: 0
SUM OF ALL RESPONSES TO PHQ9 QUESTIONS 1 & 2: 0
2. FEELING DOWN, DEPRESSED OR HOPELESS: NOT AT ALL
SUM OF ALL RESPONSES TO PHQ QUESTIONS 1-9: 0
SUM OF ALL RESPONSES TO PHQ QUESTIONS 1-9: 0
1. LITTLE INTEREST OR PLEASURE IN DOING THINGS: NOT AT ALL
1. LITTLE INTEREST OR PLEASURE IN DOING THINGS: NOT AT ALL
SUM OF ALL RESPONSES TO PHQ QUESTIONS 1-9: 0

## 2025-01-07 ENCOUNTER — OFFICE VISIT (OUTPATIENT)
Dept: INTERNAL MEDICINE CLINIC | Age: 45
End: 2025-01-07
Payer: COMMERCIAL

## 2025-01-07 VITALS
RESPIRATION RATE: 18 BRPM | TEMPERATURE: 97.8 F | DIASTOLIC BLOOD PRESSURE: 78 MMHG | OXYGEN SATURATION: 97 % | WEIGHT: 254.2 LBS | HEART RATE: 76 BPM | BODY MASS INDEX: 34.48 KG/M2 | SYSTOLIC BLOOD PRESSURE: 118 MMHG

## 2025-01-07 DIAGNOSIS — E78.5 DYSLIPIDEMIA: ICD-10-CM

## 2025-01-07 DIAGNOSIS — Z79.4 TYPE 2 DIABETES MELLITUS WITHOUT COMPLICATION, WITH LONG-TERM CURRENT USE OF INSULIN (HCC): Primary | ICD-10-CM

## 2025-01-07 DIAGNOSIS — Z79.4 TYPE 2 DIABETES MELLITUS WITHOUT COMPLICATION, WITH LONG-TERM CURRENT USE OF INSULIN (HCC): ICD-10-CM

## 2025-01-07 DIAGNOSIS — E11.9 TYPE 2 DIABETES MELLITUS WITHOUT COMPLICATION, WITH LONG-TERM CURRENT USE OF INSULIN (HCC): ICD-10-CM

## 2025-01-07 DIAGNOSIS — E11.9 TYPE 2 DIABETES MELLITUS WITHOUT COMPLICATION, WITH LONG-TERM CURRENT USE OF INSULIN (HCC): Primary | ICD-10-CM

## 2025-01-07 LAB — HBA1C MFR BLD: 7.3 % (ref 4.3–5.7)

## 2025-01-07 PROCEDURE — 1036F TOBACCO NON-USER: CPT | Performed by: INTERNAL MEDICINE

## 2025-01-07 PROCEDURE — G8417 CALC BMI ABV UP PARAM F/U: HCPCS | Performed by: INTERNAL MEDICINE

## 2025-01-07 PROCEDURE — 83036 HEMOGLOBIN GLYCOSYLATED A1C: CPT | Performed by: INTERNAL MEDICINE

## 2025-01-07 PROCEDURE — 2022F DILAT RTA XM EVC RTNOPTHY: CPT | Performed by: INTERNAL MEDICINE

## 2025-01-07 PROCEDURE — G8427 DOCREV CUR MEDS BY ELIG CLIN: HCPCS | Performed by: INTERNAL MEDICINE

## 2025-01-07 PROCEDURE — M1308 PR FLU IMMUNIZE NO ADMIN: HCPCS | Performed by: INTERNAL MEDICINE

## 2025-01-07 PROCEDURE — 3051F HG A1C>EQUAL 7.0%<8.0%: CPT | Performed by: INTERNAL MEDICINE

## 2025-01-07 PROCEDURE — 99214 OFFICE O/P EST MOD 30 MIN: CPT | Performed by: INTERNAL MEDICINE

## 2025-01-07 RX ORDER — METFORMIN HYDROCHLORIDE 500 MG/1
TABLET, EXTENDED RELEASE ORAL
Qty: 360 TABLET | Refills: 3 | Status: SHIPPED | OUTPATIENT
Start: 2025-01-07

## 2025-01-07 RX ORDER — DULAGLUTIDE 0.75 MG/.5ML
0.75 INJECTION, SOLUTION SUBCUTANEOUS WEEKLY
Qty: 4 ADJUSTABLE DOSE PRE-FILLED PEN SYRINGE | Refills: 3 | Status: SHIPPED | OUTPATIENT
Start: 2025-01-07

## 2025-01-07 NOTE — PROGRESS NOTES
exam yearly.       Dyslipidemia - last LDL was > 100 per last lipid panel last year, he admitted at times he was not taking regularly   Recommend he gets back on track , and take it daily  And recommend dietary and life style changes.     He is on low-dose ramipril 1.25 mg we will continue with the same, as outlined above the recent microalbumin was noted which is within normal range, for renal protection. Denies cough or URI symptoms.       Weight loss and increase exercise has been recommended.  as his BMI is nearly 34 .       Lab Results   Component Value Date/Time     08/23/2024 07:57 AM    K 5.4 08/23/2024 07:57 AM     08/23/2024 07:57 AM    CO2 26 08/23/2024 07:57 AM    BUN 14 08/23/2024 07:57 AM    CREATININE 0.98 08/23/2024 07:57 AM    GLUCOSE 258 08/23/2024 07:57 AM    CALCIUM 9.6 08/23/2024 07:57 AM    LABGLOM 96 07/18/2015 12:00 AM            RTO  4   months . He is encouraged to call us in 4-5 weeks so that we can titrate trulicity as tolerated ..     Signed by : Praneeth Romo M.D.    [facility] Internal Medicine  [address] [place] [address], [address]  [place] [address]    Tel  [phone]  Fax [phone]

## 2025-01-08 RX ORDER — INSULIN LISPRO 100 [IU]/ML
INJECTION, SOLUTION INTRAVENOUS; SUBCUTANEOUS
Qty: 15 ADJUSTABLE DOSE PRE-FILLED PEN SYRINGE | Refills: 3 | Status: SHIPPED | OUTPATIENT
Start: 2025-01-08

## 2025-01-20 RX ORDER — FLURBIPROFEN SODIUM 0.3 MG/ML
SOLUTION/ DROPS OPHTHALMIC
Qty: 100 EACH | Refills: 5 | Status: SHIPPED | OUTPATIENT
Start: 2025-01-20

## 2025-02-04 RX ORDER — DULAGLUTIDE 0.75 MG/.5ML
0.75 INJECTION, SOLUTION SUBCUTANEOUS WEEKLY
Qty: 4 ADJUSTABLE DOSE PRE-FILLED PEN SYRINGE | Refills: 3 | Status: CANCELLED | OUTPATIENT
Start: 2025-02-04

## 2025-02-04 NOTE — TELEPHONE ENCOUNTER
Last ordered 1/7, disp 4, refills 3    *patient sends note requesting to increase dose  - called patient, he is not experiencing any side effects  -no low blood sugars    Last visit 1/7:    He is taking humalog insulin pre meals, and metformin   Overall sugars have improved, and I congratulated him . He wants to get back on similar agent, so we will go ahead with Mounjaro, and recommended weight loss and dietary modification.  Once again he is off the GLP-1 agents because he is not able to get them through his insurance and they are expensive, initially Trulicity then Mounjaro.    As its a new year he wants to get back on that, so Trulicity   Will be given and increase as tolerated. I recommend he gets eye exam yearly.      Next visit 5/27    1.5 mg dose pended your approval.

## 2025-05-14 DIAGNOSIS — Z79.4 TYPE 2 DIABETES MELLITUS WITHOUT COMPLICATION, WITH LONG-TERM CURRENT USE OF INSULIN (HCC): ICD-10-CM

## 2025-05-14 DIAGNOSIS — E11.9 TYPE 2 DIABETES MELLITUS WITHOUT COMPLICATION, WITH LONG-TERM CURRENT USE OF INSULIN (HCC): ICD-10-CM

## 2025-05-15 NOTE — TELEPHONE ENCOUNTER
Last ordered 1/7/2025, disp 6 refill 3     *patient is upgrading to 2+    Last visit 1/7  Next visit 5/27

## 2025-05-16 ENCOUNTER — PATIENT MESSAGE (OUTPATIENT)
Dept: INTERNAL MEDICINE CLINIC | Age: 45
End: 2025-05-16

## 2025-05-16 DIAGNOSIS — Z79.4 TYPE 2 DIABETES MELLITUS WITHOUT COMPLICATION, WITH LONG-TERM CURRENT USE OF INSULIN (HCC): Primary | ICD-10-CM

## 2025-05-16 DIAGNOSIS — E11.9 TYPE 2 DIABETES MELLITUS WITHOUT COMPLICATION, WITH LONG-TERM CURRENT USE OF INSULIN (HCC): Primary | ICD-10-CM

## 2025-05-16 DIAGNOSIS — E11.40 TYPE 2 DIABETES MELLITUS WITH DIABETIC NEUROPATHY, WITH LONG-TERM CURRENT USE OF INSULIN (HCC): ICD-10-CM

## 2025-05-16 DIAGNOSIS — Z79.4 TYPE 2 DIABETES MELLITUS WITH DIABETIC NEUROPATHY, WITH LONG-TERM CURRENT USE OF INSULIN (HCC): ICD-10-CM

## 2025-05-19 RX ORDER — BLOOD-GLUCOSE SENSOR
EACH MISCELLANEOUS
Qty: 6 EACH | Refills: 3 | Status: SHIPPED | OUTPATIENT
Start: 2025-05-19

## 2025-05-22 LAB
ALBUMIN: 4.4 G/DL (ref 3.5–5.2)
ALK PHOSPHATASE: 80 U/L (ref 39–118)
ALT SERPL-CCNC: 25 U/L (ref 5–41)
ANION GAP SERPL CALCULATED.3IONS-SCNC: 12 MMOL/L (ref 7–16)
AST SERPL-CCNC: 14 U/L (ref 9–50)
BILIRUB SERPL-MCNC: 0.3 MG/DL
BUN BLDV-MCNC: 11 MG/DL (ref 6–20)
CALCIUM SERPL-MCNC: 9.7 MG/DL (ref 8.6–10.5)
CHLORIDE BLD-SCNC: 105 MMOL/L (ref 96–107)
CHOLESTEROL, TOTAL: 126 MG/DL (ref 100–199)
CHOLESTEROL/HDL RATIO: 4.3 (ref 2–4.5)
CO2: 25 MMOL/L (ref 18–32)
CREAT SERPL-MCNC: 0.93 MG/DL (ref 0.67–1.3)
CREATINE, URINE: 185.8 MG/DL
EGFR IF NONAFRICAN AMERICAN: 103 ML/MIN/1.73M2
ESTIMATED AVERAGE GLUCOSE: 174 MG/DL
GLUCOSE: 156 MG/DL (ref 70–100)
HBA1C MFR BLD: 7.7 %
HDLC SERPL-MCNC: 29 MG/DL
LDL CHOLESTEROL: 81 MG/DL
LDL/HDL RATIO: 2.8
MICROALBUMIN/CREAT 24H UR: 5.1 MG/L
MICROALBUMIN/CREAT UR-RTO: 3 MG/G
POTASSIUM SERPL-SCNC: 4.4 MMOL/L (ref 3.5–5.4)
SODIUM BLD-SCNC: 142 MMOL/L (ref 135–148)
TOTAL PROTEIN: 6.8 G/DL (ref 6–8.3)
TRIGL SERPL-MCNC: 79 MG/DL (ref 20–149)
TSH SERPL DL<=0.05 MIU/L-ACNC: 1.28 UIU/ML (ref 0.27–4.2)
VLDLC SERPL CALC-MCNC: 16 MG/DL

## 2025-05-29 NOTE — PROGRESS NOTES
IM note    New dose of trulicity 3 mg weekly , done the script today   And he is tolerating the 1.5 mg w/o issues. And we will see   Him in 2 weeks in the office as scheduled.       Electronically signed by Praneeth Romo MD on 5/29/2025 at 11:51 AM

## 2025-05-29 NOTE — TELEPHONE ENCOUNTER
Last ordered 4/2, disp 2 ml, refill 1    Last visit 1/7:    DM-2      He is taking humalog insulin pre meals, and metformin    Overall sugars have improved, and I congratulated him . He wants to get back on similar agent, so we will go ahead with Mounjaro, and recommended weight loss and dietary modification.  Once again he is off the GLP-1 agents because he is not able to get them through his insurance and they are expensive, initially Trulicity then Mounjaro.    As its a new year he wants to get back on that, so Trulicity   Will be given and increase as tolerated. I recommend he gets eye exam yearly.      Hemoglobin A1C                Component  Ref Range & Units (hover) 5/21/25 0800 1/7/25 0757 7/25/24 0745 3/21/24 0750 11/1/23 0828 4/21/23 0810 11/21/22 0857   Hemoglobin A1C 7.7 High  7.3 High  R, CM 7.1 High  R, CM 7.7 High  R, CM 6.2 High  R, CM 6.8 High  R, CM 6.8 High  R, CM      Estimated Avg Glucose 174 High      148 High  R, CM         Next visit 6/13

## 2025-05-30 ENCOUNTER — PATIENT MESSAGE (OUTPATIENT)
Dept: INTERNAL MEDICINE CLINIC | Age: 45
End: 2025-05-30

## 2025-05-30 DIAGNOSIS — Z79.4 TYPE 2 DIABETES MELLITUS WITH DIABETIC NEUROPATHY, WITH LONG-TERM CURRENT USE OF INSULIN (HCC): ICD-10-CM

## 2025-05-30 DIAGNOSIS — E11.40 TYPE 2 DIABETES MELLITUS WITH DIABETIC NEUROPATHY, WITH LONG-TERM CURRENT USE OF INSULIN (HCC): ICD-10-CM

## 2025-06-02 NOTE — TELEPHONE ENCOUNTER
Last ordered 5/19/25- patient requesting pharmacy change due to pricing    Last visit 1/7  Next visit 6/13

## 2025-06-05 RX ORDER — BLOOD-GLUCOSE SENSOR
EACH MISCELLANEOUS
Qty: 6 EACH | Refills: 3 | Status: SHIPPED | OUTPATIENT
Start: 2025-06-05

## 2025-06-13 ENCOUNTER — OFFICE VISIT (OUTPATIENT)
Dept: INTERNAL MEDICINE CLINIC | Age: 45
End: 2025-06-13
Payer: COMMERCIAL

## 2025-06-13 VITALS
TEMPERATURE: 98 F | HEART RATE: 94 BPM | RESPIRATION RATE: 18 BRPM | SYSTOLIC BLOOD PRESSURE: 116 MMHG | DIASTOLIC BLOOD PRESSURE: 80 MMHG | BODY MASS INDEX: 33.55 KG/M2 | WEIGHT: 247.4 LBS | OXYGEN SATURATION: 97 %

## 2025-06-13 DIAGNOSIS — E11.40 TYPE 2 DIABETES MELLITUS WITH DIABETIC NEUROPATHY, WITH LONG-TERM CURRENT USE OF INSULIN (HCC): Primary | ICD-10-CM

## 2025-06-13 DIAGNOSIS — E78.5 DYSLIPIDEMIA, GOAL LDL BELOW 70: ICD-10-CM

## 2025-06-13 DIAGNOSIS — Z79.4 TYPE 2 DIABETES MELLITUS WITH DIABETIC NEUROPATHY, WITH LONG-TERM CURRENT USE OF INSULIN (HCC): Primary | ICD-10-CM

## 2025-06-13 DIAGNOSIS — E66.09 OBESITY DUE TO EXCESS CALORIES WITHOUT SERIOUS COMORBIDITY, UNSPECIFIED CLASS: ICD-10-CM

## 2025-06-13 PROCEDURE — 99214 OFFICE O/P EST MOD 30 MIN: CPT | Performed by: INTERNAL MEDICINE

## 2025-06-13 PROCEDURE — 2022F DILAT RTA XM EVC RTNOPTHY: CPT | Performed by: INTERNAL MEDICINE

## 2025-06-13 PROCEDURE — 3051F HG A1C>EQUAL 7.0%<8.0%: CPT | Performed by: INTERNAL MEDICINE

## 2025-06-13 PROCEDURE — G8417 CALC BMI ABV UP PARAM F/U: HCPCS | Performed by: INTERNAL MEDICINE

## 2025-06-13 PROCEDURE — G8427 DOCREV CUR MEDS BY ELIG CLIN: HCPCS | Performed by: INTERNAL MEDICINE

## 2025-06-13 PROCEDURE — 1036F TOBACCO NON-USER: CPT | Performed by: INTERNAL MEDICINE

## 2025-06-13 SDOH — ECONOMIC STABILITY: FOOD INSECURITY: WITHIN THE PAST 12 MONTHS, THE FOOD YOU BOUGHT JUST DIDN'T LAST AND YOU DIDN'T HAVE MONEY TO GET MORE.: NEVER TRUE

## 2025-06-13 SDOH — ECONOMIC STABILITY: FOOD INSECURITY: WITHIN THE PAST 12 MONTHS, YOU WORRIED THAT YOUR FOOD WOULD RUN OUT BEFORE YOU GOT MONEY TO BUY MORE.: NEVER TRUE

## 2025-06-13 NOTE — PROGRESS NOTES
status:      Spouse name: Not on file    Number of children: Not on file    Years of education: Not on file    Highest education level: Not on file   Occupational History    Occupation: Security/Firealarm Installation   Tobacco Use    Smoking status: Former     Current packs/day: 0.00     Average packs/day: 1 pack/day for 15.0 years (15.0 ttl pk-yrs)     Types: Cigarettes     Start date: 8/6/1999     Quit date: 8/6/2014     Years since quitting: 10.8    Smokeless tobacco: Never   Substance and Sexual Activity    Alcohol use: No     Alcohol/week: 0.0 standard drinks of alcohol    Drug use: No    Sexual activity: Not on file   Other Topics Concern    Not on file   Social History Narrative    Not on file     Social Drivers of Health     Financial Resource Strain: Low Risk  (11/1/2023)    Overall Financial Resource Strain (CARDIA)     Difficulty of Paying Living Expenses: Not hard at all   Food Insecurity: No Food Insecurity (6/13/2025)    Hunger Vital Sign     Worried About Running Out of Food in the Last Year: Never true     Ran Out of Food in the Last Year: Never true   Transportation Needs: No Transportation Needs (6/13/2025)    PRAPARE - Transportation     Lack of Transportation (Medical): No     Lack of Transportation (Non-Medical): No   Physical Activity: Not on file   Stress: Not on file   Social Connections: Not on file   Intimate Partner Violence: Not on file   Housing Stability: Low Risk  (6/13/2025)    Housing Stability Vital Sign     Unable to Pay for Housing in the Last Year: No     Number of Times Moved in the Last Year: 0     Homeless in the Last Year: No       Family History   Problem Relation Age of Onset    Diabetes Mother     Heart Disease Father     Diabetes Maternal Grandfather     Cancer Paternal Grandmother     Heart Disease Paternal Grandfather        Review of Systems     See HPI     /80   Pulse 94   Temp 98 °F (36.7 °C)   Resp 18   Wt 112.2 kg (247 lb 6.4 oz)   SpO2 97%

## 2025-09-05 RX ORDER — RAMIPRIL 1.25 MG/1
1.25 CAPSULE ORAL DAILY
Qty: 90 CAPSULE | Refills: 3 | Status: SHIPPED | OUTPATIENT
Start: 2025-09-05

## 2025-09-05 RX ORDER — ATORVASTATIN CALCIUM 20 MG/1
20 TABLET, FILM COATED ORAL NIGHTLY
Qty: 90 TABLET | Refills: 3 | Status: SHIPPED | OUTPATIENT
Start: 2025-09-05